# Patient Record
Sex: MALE | Race: WHITE | NOT HISPANIC OR LATINO | Employment: FULL TIME | URBAN - METROPOLITAN AREA
[De-identification: names, ages, dates, MRNs, and addresses within clinical notes are randomized per-mention and may not be internally consistent; named-entity substitution may affect disease eponyms.]

---

## 2017-01-05 ENCOUNTER — GENERIC CONVERSION - ENCOUNTER (OUTPATIENT)
Dept: OTHER | Facility: OTHER | Age: 67
End: 2017-01-05

## 2017-01-12 ENCOUNTER — ALLSCRIPTS OFFICE VISIT (OUTPATIENT)
Dept: OTHER | Facility: OTHER | Age: 67
End: 2017-01-12

## 2017-01-12 DIAGNOSIS — M54.50 LOW BACK PAIN: ICD-10-CM

## 2017-01-16 ENCOUNTER — GENERIC CONVERSION - ENCOUNTER (OUTPATIENT)
Dept: OTHER | Facility: OTHER | Age: 67
End: 2017-01-16

## 2017-02-17 ENCOUNTER — GENERIC CONVERSION - ENCOUNTER (OUTPATIENT)
Dept: OTHER | Facility: OTHER | Age: 67
End: 2017-02-17

## 2017-03-24 ENCOUNTER — GENERIC CONVERSION - ENCOUNTER (OUTPATIENT)
Dept: OTHER | Facility: OTHER | Age: 67
End: 2017-03-24

## 2018-01-14 VITALS
HEIGHT: 73 IN | HEART RATE: 78 BPM | RESPIRATION RATE: 16 BRPM | SYSTOLIC BLOOD PRESSURE: 130 MMHG | BODY MASS INDEX: 32.6 KG/M2 | WEIGHT: 246 LBS | OXYGEN SATURATION: 97 % | DIASTOLIC BLOOD PRESSURE: 80 MMHG

## 2018-05-15 ENCOUNTER — OFFICE VISIT (OUTPATIENT)
Dept: FAMILY MEDICINE CLINIC | Facility: CLINIC | Age: 68
End: 2018-05-15
Payer: COMMERCIAL

## 2018-05-15 VITALS
WEIGHT: 243 LBS | DIASTOLIC BLOOD PRESSURE: 84 MMHG | SYSTOLIC BLOOD PRESSURE: 126 MMHG | HEIGHT: 73 IN | BODY MASS INDEX: 32.2 KG/M2
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
## 2018-05-15 DIAGNOSIS — B35.6 TINEA CRURIS: ICD-10-CM

## 2018-05-15 DIAGNOSIS — Z00.00 WELLNESS EXAMINATION: ICD-10-CM

## 2018-05-15 PROCEDURE — 1036F TOBACCO NON-USER: CPT | Performed by: FAMILY MEDICINE

## 2018-05-15 PROCEDURE — G0438 PPPS, INITIAL VISIT: HCPCS | Performed by: FAMILY MEDICINE

## 2018-05-15 PROCEDURE — 99213 OFFICE O/P EST LOW 20 MIN: CPT | Performed by: FAMILY MEDICINE

## 2018-05-15 RX ORDER — IBUPROFEN 800 MG/1
1 TABLET ORAL 3 TIMES DAILY
COMMUNITY
Start: 2016-10-17 | End: 2020-01-24 | Stop reason: ALTCHOICE

## 2018-05-15 RX ORDER — CLOTRIMAZOLE AND BETAMETHASONE DIPROPIONATE 10; .64 MG/G; MG/G
CREAM TOPICAL 2 TIMES DAILY
Qty: 30 G | Refills: 0
Start: 2018-05-15 | End: 2019-09-03

## 2018-05-15 NOTE — PROGRESS NOTES
HPI:  Ginna Youngblood is a 76 y o  male here for his Initial Wellness Visit  Patient Active Problem List   Diagnosis    Elevated fasting blood sugar    Hyperlipemia, mixed     Past Medical History:   Diagnosis Date    Back pain      History reviewed  No pertinent surgical history  History reviewed  No pertinent family history  History   Smoking Status    Never Smoker   Smokeless Tobacco    Never Used     History   Alcohol Use No      History   Drug Use No     /84   Ht 6' 1" (1 854 m)   Wt 110 kg (243 lb)   BMI 32 06 kg/m²       Current Outpatient Prescriptions   Medication Sig Dispense Refill    clotrimazole-betamethasone (LOTRISONE) 1-0 05 % cream Apply topically 2 (two) times a day 30 g 0    cyclobenzaprine (FLEXERIL) 10 mg tablet Take 1 tablet by mouth 3 (three) times a day as needed for muscle spasms 15 tablet 0    HYDROcodone-acetaminophen (NORCO) 5-325 mg per tablet Take 1 tablet by mouth every 4 (four) hours as needed for pain Max Daily Amount: 6 tablets 18 tablet 0    ibuprofen (MOTRIN) 800 mg tablet Take 1 tablet by mouth 3 (three) times a day       No current facility-administered medications for this visit  No Known Allergies    There is no immunization history on file for this patient      Patient Care Team:  Maryellen De La Rosa MD as PCP - General    Medicare Screening Tests and Risk Assessments:  AWV Clinical     ISAR:       Once in a Lifetime Medicare Screening:       Medicare Screening Tests and Risk Assessment:   AAA Risk Assessment    Osteoporosis Risk Assessment    HIV Risk Assessment        Drug and Alcohol Use:   Tobacco use    Tobacco use duration    Tobacco Cessation Readiness    Alcohol use    Alcohol Treatment Readiness   Illicit Drug Use        Diet & Exercise:   Diet   How many servings a day of the following:   Exercise        Cognitive Impairment Screening:   Cognitive Impairment Screening        Functional Ability/Level of Safety:   Hearing    Hearing Impairment Assessment    Current Activities    Help needed with the folllowing:    ADL    Fall Risk   Injury History       Home Safety:   Home Safety Risk Factors       Advanced Directives:   Advanced Directives    Patient's End of Life Decisions        Urinary Incontinence:       Glaucoma:            Provider Screening    No data filed        No exam data present    Physical Exam :  Physical Exam    Reviewed Updated [facility] Prior Wellness Visits:   Last Medicare wellness visit information was reviewed, patient interviewed , no change since last AWVyes  Last Medicare wellness visit information was reviewed, patient interviewed and updates made to the record today yes    Assessment and Plan:  1  Tick bite, initial encounter     2   Tinea cruris  clotrimazole-betamethasone (LOTRISONE) 1-0 05 % cream       Health Maintenance Due   Topic Date Due    Hepatitis C Screening  1950    Annual Wellnes Visit (AWV)  1950    COLONOSCOPY  1950    Depression Screening PHQ-9  01/08/1962    DTaP,Tdap,and Td Vaccines (1 - Tdap) 01/08/1971    Fall Risk  01/08/2015    PNEUMOCOCCAL POLYSACCHARIDE VACCINE AGE 65 AND OVER  01/08/2015    GLAUCOMA SCREENING 67+ YR  01/08/2017

## 2018-05-15 NOTE — PATIENT INSTRUCTIONS
Patient given paper prescription of Doxycycline to fill if he begins experiencing symptoms  Symptoms of fevers, chills, joint aches, body aches, and other flu-like symptoms are reasons to get doxy prescription filled  Patient given Lotrisone to treat tinea cruris

## 2018-05-15 NOTE — PROGRESS NOTES
[facility]         NAME: Daniel Cat is a 76 y o  male  : 1950    MRN: [id number]  DATE: May 15, 2018  TIME: 9:22 AM    Assessment and Plan   Tick bite, initial encounter [W57  XXXA]  1  Tick bite, initial encounter     2  Tinea cruris  clotrimazole-betamethasone (LOTRISONE) 1-0 05 % cream         Patient Instructions     Patient Instructions   Patient given paper prescription of Doxycycline to fill if he begins experiencing symptoms  Symptoms of fevers, chills, joint aches, body aches, and other flu-like symptoms are reasons to get doxy prescription filled  Patient given Lotrisone to treat tinea cruris  Chief Complaint     Chief Complaint   Patient presents with    Tick Bite     Wants testing on tick and himself         History of Present Illness       Patient found tic on leg on  afternoon  Believes that it may have been attached since Saturday  Also reports rash in groin near tic bite  No fevers, chills, fatigue, joint aches  Review of Systems   Review of Systems   Constitutional: Negative for chills, diaphoresis, fatigue and fever  HENT: Negative for congestion, ear discharge, ear pain, facial swelling, rhinorrhea, sinus pain, sinus pressure, sneezing, sore throat and tinnitus  Eyes: Negative for pain, discharge, redness and itching  Respiratory: Negative for cough, chest tightness, shortness of breath and stridor  Cardiovascular: Negative for chest pain, palpitations and leg swelling  Gastrointestinal: Negative for abdominal pain, constipation, diarrhea, nausea and vomiting  Genitourinary: Negative for difficulty urinating and dysuria  Musculoskeletal: Negative for arthralgias, back pain, gait problem, joint swelling and myalgias  Skin: Positive for rash  Negative for color change, pallor and wound  Neurological: Negative for dizziness, seizures, syncope, light-headedness and headaches           Current Medications       Current Outpatient Prescriptions:     clotrimazole-betamethasone (LOTRISONE) 1-0 05 % cream, Apply topically 2 (two) times a day, Disp: 30 g, Rfl: 0    cyclobenzaprine (FLEXERIL) 10 mg tablet, Take 1 tablet by mouth 3 (three) times a day as needed for muscle spasms, Disp: 15 tablet, Rfl: 0    HYDROcodone-acetaminophen (NORCO) 5-325 mg per tablet, Take 1 tablet by mouth every 4 (four) hours as needed for pain Max Daily Amount: 6 tablets, Disp: 18 tablet, Rfl: 0    ibuprofen (MOTRIN) 800 mg tablet, Take 1 tablet by mouth 3 (three) times a day, Disp: , Rfl:     Current Allergies     Allergies as of 05/15/2018    (No Known Allergies)            The following portions of the patient's history were reviewed and updated as appropriate: allergies, current medications, past family history, past medical history, past social history, past surgical history and problem list      Past Medical History:   Diagnosis Date    Back pain        History reviewed  No pertinent surgical history  History reviewed  No pertinent family history  Medications have been verified  Objective   /84   Ht 6' 1" (1 854 m)   Wt 110 kg (243 lb)   BMI 32 06 kg/m²        Physical Exam     Physical Exam   Constitutional: He is oriented to person, place, and time  He appears well-developed and well-nourished  No distress  HENT:   Head: Normocephalic and atraumatic  Mouth/Throat: Oropharynx is clear and moist  No oropharyngeal exudate  Eyes: Conjunctivae and EOM are normal  Pupils are equal, round, and reactive to light  No scleral icterus  Neck: Normal range of motion  Cardiovascular: Normal rate, regular rhythm and normal heart sounds  Exam reveals no gallop and no friction rub  No murmur heard  Pulmonary/Chest: Effort normal and breath sounds normal  No respiratory distress  Abdominal: Soft  He exhibits no distension  There is no tenderness     Genitourinary:         Genitourinary Comments: Tinea cruris at R & L groin Musculoskeletal: Normal range of motion  He exhibits no edema or deformity  Lymphadenopathy:     He has no cervical adenopathy  Neurological: He is alert and oriented to person, place, and time  Skin: Skin is warm and dry  Rash noted  He is not diaphoretic  No erythema  Psychiatric: He has a normal mood and affect   His behavior is normal  Thought content normal

## 2018-05-22 DIAGNOSIS — B35.6 TINEA CRURIS: ICD-10-CM

## 2018-05-22 RX ORDER — CLOTRIMAZOLE AND BETAMETHASONE DIPROPIONATE 10; .64 MG/G; MG/G
CREAM TOPICAL 2 TIMES DAILY
Qty: 45 G | Refills: 0 | Status: CANCELLED | OUTPATIENT
Start: 2018-05-22

## 2018-05-22 NOTE — TELEPHONE ENCOUNTER
LOTRISONE CREAM NOT COVERED FOR MORE THEN 21 DAYS    USING THIS FOR A FUNGAL INFECTION IN [place]  HE WILL NEED A NEW RX IF YOU WANT HIM TO CONTINUE AND A PRIOR AUTHORIZATION FOR THIS MEDICATION

## 2018-06-22 ENCOUNTER — OFFICE VISIT (OUTPATIENT)
Dept: FAMILY MEDICINE CLINIC | Facility: CLINIC | Age: 68
End: 2018-06-22
Payer: COMMERCIAL

## 2018-06-22 VITALS
OXYGEN SATURATION: 95 % | DIASTOLIC BLOOD PRESSURE: 78 MMHG | HEIGHT: 73 IN | HEART RATE: 76 BPM | BODY MASS INDEX: 32.87 KG/M2 | WEIGHT: 248 LBS | SYSTOLIC BLOOD PRESSURE: 140 MMHG

## 2018-06-22 DIAGNOSIS — R53.83 FATIGUE, UNSPECIFIED TYPE: Primary | ICD-10-CM

## 2018-06-22 PROCEDURE — 3008F BODY MASS INDEX DOCD: CPT | Performed by: FAMILY MEDICINE

## 2018-06-22 PROCEDURE — 1160F RVW MEDS BY RX/DR IN RCRD: CPT | Performed by: FAMILY MEDICINE

## 2018-06-22 PROCEDURE — 99213 OFFICE O/P EST LOW 20 MIN: CPT | Performed by: FAMILY MEDICINE

## 2018-06-22 NOTE — PROGRESS NOTES
[facility]         NAME: Naima Wagner is a 76 y o  male  : 1950    MRN: [id number]  DATE: 2018  TIME: 3:04 PM    Assessment and Plan   Fatigue, unspecified type [R53 83]  1  Fatigue, unspecified type           Patient Instructions     Patient Instructions   Since symptoms have resolved, patient instructed to call if symptoms return and will order TSH, B12, CBC, and testosterone prior to next visit          Chief Complaint     Chief Complaint   Patient presents with    Follow-up     lyme & fatigue         History of Present Illness       Patient presents complaining of fatigue 18 months  He states it has improved and has no other concerns  He was recently treated for Lyme with doxycycline and completed the full course of abx  He currently denies any fevers, chills, chest pain, SOB, abdominal pain, N/V/D or skin changes  Review of Systems   Review of Systems   Constitutional: Positive for fatigue  Negative for appetite change, chills, diaphoresis and fever  HENT: Negative for ear pain, rhinorrhea, sinus pressure and sore throat  Eyes: Negative for discharge, redness and itching  Respiratory: Negative for cough, shortness of breath and wheezing  Cardiovascular: Negative for chest pain and palpitations  Gastrointestinal: Negative for abdominal pain, diarrhea, nausea and vomiting  Genitourinary: Negative for dysuria, frequency and hematuria  Skin: Negative for rash and wound  Neurological: Negative for syncope, light-headedness and numbness           Current Medications       Current Outpatient Prescriptions:     clotrimazole-betamethasone (LOTRISONE) 1-0 05 % cream, Apply topically 2 (two) times a day, Disp: 30 g, Rfl: 0    cyclobenzaprine (FLEXERIL) 10 mg tablet, Take 1 tablet by mouth 3 (three) times a day as needed for muscle spasms, Disp: 15 tablet, Rfl: 0    HYDROcodone-acetaminophen (NORCO) 5-325 mg per tablet, Take 1 tablet by mouth every 4 (four) hours as needed for pain Max Daily Amount: 6 tablets, Disp: 18 tablet, Rfl: 0    ibuprofen (MOTRIN) 800 mg tablet, Take 1 tablet by mouth 3 (three) times a day, Disp: , Rfl:     Current Allergies     Allergies as of 06/22/2018    (No Known Allergies)            The following portions of the patient's history were reviewed and updated as appropriate: allergies, current medications, past family history, past medical history, past social history, past surgical history and problem list      Past Medical History:   Diagnosis Date    Back pain        No past surgical history on file  No family history on file  Medications have been verified  Objective   /78   Pulse 76   Ht 6' 1" (1 854 m)   Wt 112 kg (248 lb)   SpO2 95%   BMI 32 72 kg/m²        Physical Exam     Physical Exam   Constitutional: He appears well-developed and well-nourished  No distress  HENT:   Right Ear: Tympanic membrane, external ear and ear canal normal  Tympanic membrane is not injected  Left Ear: Tympanic membrane, external ear and ear canal normal  Tympanic membrane is not injected  Nose: Nose normal    Mouth/Throat: Uvula is midline, oropharynx is clear and moist and mucous membranes are normal    Eyes: Conjunctivae are normal  Pupils are equal, round, and reactive to light  Neck: Normal range of motion  Neck supple  No thyromegaly present  Cardiovascular: Normal rate, regular rhythm and normal heart sounds  No murmur heard  Pulmonary/Chest: Effort normal and breath sounds normal  No respiratory distress  He has no wheezes  Lymphadenopathy:     He has no cervical adenopathy  Skin: He is not diaphoretic

## 2018-06-22 NOTE — PATIENT INSTRUCTIONS
Since symptoms have resolved, patient instructed to call if symptoms return and will order TSH, B12, CBC, and testosterone prior to next visit

## 2019-05-16 ENCOUNTER — TELEPHONE (OUTPATIENT)
Dept: FAMILY MEDICINE CLINIC | Facility: CLINIC | Age: 69
End: 2019-05-16

## 2019-07-08 ENCOUNTER — TELEPHONE (OUTPATIENT)
Dept: FAMILY MEDICINE CLINIC | Facility: CLINIC | Age: 69
End: 2019-07-08

## 2019-08-23 ENCOUNTER — OFFICE VISIT (OUTPATIENT)
Dept: FAMILY MEDICINE CLINIC | Facility: CLINIC | Age: 69
End: 2019-08-23
Payer: COMMERCIAL

## 2019-08-23 ENCOUNTER — TELEPHONE (OUTPATIENT)
Dept: FAMILY MEDICINE CLINIC | Facility: CLINIC | Age: 69
End: 2019-08-23

## 2019-08-23 ENCOUNTER — HOSPITAL ENCOUNTER (OUTPATIENT)
Dept: RADIOLOGY | Facility: HOSPITAL | Age: 69
Discharge: HOME | End: 2019-08-23
Payer: COMMERCIAL

## 2019-08-23 VITALS
OXYGEN SATURATION: 98 % | SYSTOLIC BLOOD PRESSURE: 122 MMHG | DIASTOLIC BLOOD PRESSURE: 80 MMHG | WEIGHT: 249.56 LBS | TEMPERATURE: 98.2 F | HEIGHT: 73 IN | HEART RATE: 89 BPM | BODY MASS INDEX: 33.08 KG/M2

## 2019-08-23 DIAGNOSIS — R07.81 RIB PAIN ON LEFT SIDE: ICD-10-CM

## 2019-08-23 DIAGNOSIS — R07.81 RIB PAIN ON LEFT SIDE: Primary | ICD-10-CM

## 2019-08-23 PROCEDURE — 3008F BODY MASS INDEX DOCD: CPT | Performed by: NURSE PRACTITIONER

## 2019-08-23 PROCEDURE — 99213 OFFICE O/P EST LOW 20 MIN: CPT | Performed by: NURSE PRACTITIONER

## 2019-08-23 PROCEDURE — 71101 X-RAY EXAM UNILAT RIBS/CHEST: CPT

## 2019-08-23 PROCEDURE — 1160F RVW MEDS BY RX/DR IN RCRD: CPT | Performed by: NURSE PRACTITIONER

## 2019-08-23 PROCEDURE — 1036F TOBACCO NON-USER: CPT | Performed by: NURSE PRACTITIONER

## 2019-08-23 NOTE — PATIENT INSTRUCTIONS
Xray of rib as ordered  Tylenol/Ibuprofen/Ice for pain and inflammation as directed  Call/return with any problems or concerns  GO to ER with any chest pain or shortness of breath  Schedule appointment for health maintenance exam

## 2019-08-23 NOTE — PROGRESS NOTES
[facility]        NAME: Delaney Day is a 71 y o  male  : 1950    MRN: [id number]  DATE: 2019  TIME: 10:43 AM    Assessment and Plan   Rib pain on left side [R07 81]  1  Rib pain on left side  XR ribs 2 vw left         Patient Instructions     Patient Instructions   Xray of rib as ordered  Tylenol/Ibuprofen/Ice for pain and inflammation as directed  Call/return with any problems or concerns  GO to ER with any chest pain or shortness of breath  Schedule appointment for health maintenance exam          Chief Complaint     Chief Complaint   Patient presents with    Pain     left side rib sore for the past week         History of Present Illness       Left rib pain x 5 days  Lying on stomach to fix something at work which caused the pain  Has not taken anything to relieve the pain  Wearing a compression strap which gives some relief  Has a HX of rib fracture on left  Review of Systems   Review of Systems   Constitutional: Negative for activity change and fever  Respiratory: Negative for shortness of breath  Cardiovascular: Negative for chest pain and palpitations  Gastrointestinal: Negative for abdominal distention and abdominal pain  Musculoskeletal: Positive for myalgias (left sided rib pain)  Arthralgias: left rib pain  Neurological: Negative for dizziness, weakness and numbness           Current Medications       Current Outpatient Medications:     clotrimazole-betamethasone (LOTRISONE) 1-0 05 % cream, Apply topically 2 (two) times a day, Disp: 30 g, Rfl: 0    cyclobenzaprine (FLEXERIL) 10 mg tablet, Take 1 tablet by mouth 3 (three) times a day as needed for muscle spasms, Disp: 15 tablet, Rfl: 0    HYDROcodone-acetaminophen (NORCO) 5-325 mg per tablet, Take 1 tablet by mouth every 4 (four) hours as needed for pain Max Daily Amount: 6 tablets, Disp: 18 tablet, Rfl: 0    ibuprofen (MOTRIN) 800 mg tablet, Take 1 tablet by mouth 3 (three) times a day, Disp: , Rfl:     Current Allergies     Allergies as of 08/23/2019    (No Known Allergies)            The following portions of the patient's history were reviewed and updated as appropriate: allergies, current medications, past family history, past medical history, past social history, past surgical history and problem list      Past Medical History:   Diagnosis Date    Back pain        History reviewed  No pertinent surgical history  History reviewed  No pertinent family history  Medications have been verified  Objective   /80 (BP Location: Left arm, Patient Position: Sitting, Cuff Size: Extra-Large)   Pulse 89   Temp 98 2 °F (36 8 °C) (Tympanic)   Ht 6' 1" (1 854 m)   Wt 113 kg (249 lb 9 oz)   SpO2 98%   BMI 32 93 kg/m²        Physical Exam     Physical Exam   Constitutional: He is oriented to person, place, and time  He appears well-developed and well-nourished  No distress  Cardiovascular: Normal rate, regular rhythm and normal heart sounds  No murmur heard  Pulmonary/Chest: Effort normal and breath sounds normal  No respiratory distress  He has no wheezes  Musculoskeletal: Normal range of motion  He exhibits tenderness (tenderness with palpation to 5th rib under  left breast)  He exhibits no edema or deformity  Neurological: He is alert and oriented to person, place, and time  Skin: Skin is warm and dry  He is not diaphoretic  Psychiatric: He has a normal mood and affect  His behavior is normal  Judgment and thought content normal    Nursing note and vitals reviewed

## 2019-08-23 NOTE — TELEPHONE ENCOUNTER
----- Message from [address] [place] [address],[address] sent at 8/23/2019  4:32 PM EDT -----  Call with results   Rib and chest xray normal

## 2019-08-23 NOTE — TELEPHONE ENCOUNTER
Patient informed  But wants to know what may be causing the issue  Also questioning if  Xray if there any tumor or other   Please advise

## 2019-08-26 NOTE — TELEPHONE ENCOUNTER
Discomfort most likely due to muscle strain  Can take a few weeks to heal  It pain continues or gets worse he can follow up with orthopedic for the rib pain

## 2019-09-03 ENCOUNTER — OFFICE VISIT (OUTPATIENT)
Dept: FAMILY MEDICINE CLINIC | Facility: CLINIC | Age: 69
End: 2019-09-03
Payer: COMMERCIAL

## 2019-09-03 VITALS
BODY MASS INDEX: 33.13 KG/M2 | OXYGEN SATURATION: 98 % | WEIGHT: 250 LBS | SYSTOLIC BLOOD PRESSURE: 126 MMHG | HEIGHT: 73 IN | TEMPERATURE: 97.8 F | DIASTOLIC BLOOD PRESSURE: 80 MMHG | HEART RATE: 60 BPM

## 2019-09-03 DIAGNOSIS — E78.2 HYPERLIPEMIA, MIXED: ICD-10-CM

## 2019-09-03 DIAGNOSIS — R06.00 DYSPNEA ON EXERTION: ICD-10-CM

## 2019-09-03 DIAGNOSIS — Z00.00 MEDICARE ANNUAL WELLNESS VISIT, SUBSEQUENT: Primary | ICD-10-CM

## 2019-09-03 DIAGNOSIS — S20.212A CONTUSION OF RIB ON LEFT SIDE, INITIAL ENCOUNTER: ICD-10-CM

## 2019-09-03 DIAGNOSIS — R73.01 ELEVATED FASTING BLOOD SUGAR: ICD-10-CM

## 2019-09-03 DIAGNOSIS — Z13.6 SCREENING FOR AAA (ABDOMINAL AORTIC ANEURYSM): ICD-10-CM

## 2019-09-03 DIAGNOSIS — Z12.5 ENCOUNTER FOR PROSTATE CANCER SCREENING: ICD-10-CM

## 2019-09-03 PROCEDURE — G0439 PPPS, SUBSEQ VISIT: HCPCS | Performed by: FAMILY MEDICINE

## 2019-09-03 PROCEDURE — 3008F BODY MASS INDEX DOCD: CPT | Performed by: FAMILY MEDICINE

## 2019-09-03 PROCEDURE — 99214 OFFICE O/P EST MOD 30 MIN: CPT | Performed by: FAMILY MEDICINE

## 2019-09-03 NOTE — PATIENT INSTRUCTIONS
Medical management-repeat labs as ordered  Due to shortness of breath with exertion would refer to Cardiology for evaluation for possible stress test     Health maintenance-you have declined all vaccines  Metabolic screens will be ordered  Advanced directives were discussed forms provided  Depression screen negative  Hepatitis-C had been screened in the past was negative  PSA screening will be ordered  [place] guard was negative  Obesity   AMBULATORY CARE:   Obesity  is when your body mass index (BMI) is greater than 30  Your healthcare provider will use your height and weight to measure your BMI  The risks of obesity include  many health problems, such as injuries or physical disability  You may need tests to check for the following:  · Diabetes     · High blood pressure or high cholesterol     · Heart disease     · Gallbladder or liver disease     · Cancer of the colon, breast, prostate, liver, or kidney     · Sleep apnea     · Arthritis or gout  Seek care immediately if:   · You have a severe headache, confusion, or difficulty speaking  · You have weakness on one side of your body  · You have chest pain, sweating, or shortness of breath  Contact your healthcare provider if:   · You have symptoms of gallbladder or liver disease, such as pain in your upper abdomen  · You have knee or hip pain and discomfort while walking  · You have symptoms of diabetes, such as intense hunger and thirst, and frequent urination  · You have symptoms of sleep apnea, such as snoring or daytime sleepiness  · You have questions or concerns about your condition or care  Treatment for obesity  focuses on helping you lose weight to improve your health  Even a small decrease in BMI can reduce the risk for many health problems  Your healthcare provider will help you set a weight-loss goal   · Lifestyle changes  are the first step in treating obesity   These include making healthy food choices and getting regular physical activity  Your healthcare provider may suggest a weight-loss program that involves coaching, education, and therapy  · Medicine  may help you lose weight when it is used with a healthy diet and physical activity  · Surgery  can help you lose weight if you are very obese and have other health problems  There are several types of weight-loss surgery  Ask your healthcare provider for more information  Be successful losing weight:   · Set small, realistic goals  An example of a small goal is to walk for 20 minutes 5 days a week  Anther goal is to lose 5% of your body weight  · Tell friends, family members, and coworkers about your goals  and ask for their support  Ask a friend to lose weight with you, or join a weight-loss support group  · Identify foods or triggers that may cause you to overeat , and find ways to avoid them  Remove tempting high-calorie foods from your home and workplace  Place a bowl of fresh fruit on your kitchen counter  If stress causes you to eat, then find other ways to cope with stress  · Keep a diary to track what you eat and drink  Also write down how many minutes of physical activity you do each day  Weigh yourself once a week and record it in your diary  Eating changes: You will need to eat 500 to 1,000 fewer calories each day than you currently eat to lose 1 to 2 pounds a week  The following changes will help you cut calories:  · Eat smaller portions  Use small plates, no larger than 9 inches in diameter  Fill your plate half full of fruits and vegetables  Measure your food using measuring cups until you know what a serving size looks like  · Eat 3 meals and 1 or 2 snacks each day  Plan your meals in advance  Ana Maria Bilberry and eat at home most of the time  Eat slowly  · Eat fruits and vegetables at every meal   They are low in calories and high in fiber, which makes you feel full  Do not add butter, margarine, or cream sauce to vegetables   Use herbs to season steamed vegetables  · Eat less fat and fewer fried foods  Eat more baked or grilled chicken and fish  These protein sources are lower in calories and fat than red meat  Limit fast food  Dress your salads with olive oil and vinegar instead of bottled dressing  · Limit the amount of sugar you eat  Do not drink sugary beverages  Limit alcohol  Activity changes:  Physical activity is good for your body in many ways  It helps you burn calories and build strong muscles  It decreases stress and depression, and improves your mood  It can also help you sleep better  Talk to your healthcare provider before you begin an exercise program   · Exercise for at least 30 minutes 5 days a week  Start slowly  Set aside time each day for physical activity that you enjoy and that is convenient for you  It is best to do both weight training and an activity that increases your heart rate, such as walking, bicycling, or swimming  · Find ways to be more active  Do yard work and housecleaning  Walk up the stairs instead of using elevators  Spend your leisure time going to events that require walking, such as outdoor festivals or fairs  This extra physical activity can help you lose weight and keep it off  Follow up with your healthcare provider as directed: You may need to meet with a dietitian  Write down your questions so you remember to ask them during your visits  © 2017 [address] [facility] Information is for End User's use only and may not be sold, redistributed or otherwise used for commercial purposes  All illustrations and images included in CareNotes® are the copyrighted property of [employer]  or Arsenio Chaves  The above information is an  only  It is not intended as medical advice for individual conditions or treatments  Talk to your doctor, nurse or pharmacist before following any medical regimen to see if it is safe and effective for you    Urinary Incontinence WHAT YOU NEED TO KNOW:   What is urinary incontinence? Urinary incontinence (UI) is when you lose control of your bladder  What causes UI? UI occurs because your bladder cannot store or empty urine properly  The following are the most common types of UI:  · Stress incontinence  is when you leak urine due to increased bladder pressure  This may happen when you cough, sneeze, or exercise  · Urge incontinence  is when you feel the need to urinate right away and leak urine accidentally  · Mixed incontinence  is when you have both stress and urge UI  What are the signs and symptoms of UI?   · You feel like your bladder does not empty completely when you urinate  · You urinate often and need to urinate immediately  · You leak urine when you sleep, or you wake up with the urge to urinate  · You leak urine when you cough, sneeze, exercise, or laugh  How is UI diagnosed? Your healthcare provider will ask how often you leak urine and whether you have stress or urge symptoms  Tell him which medicines you take, how often you urinate, and how much liquid you drink each day  You may need any of the following tests:  · Urine tests  may show infection or kidney function  · A pelvic exam  may be done to check for blockages  A pelvic exam will also show if your bladder, uterus, or other organs have moved out of place  · An x-ray, ultrasound, or CT  may show problems with parts of your urinary system  You may be given contrast liquid to help your organs show up better in the pictures  Tell the healthcare provider if you have ever had an allergic reaction to contrast liquid  Do not enter the MRI room with anything metal  Metal can cause serious injury  Tell the healthcare provider if you have any metal in or on your body  · A bladder scan  will show how much urine is left in your bladder after you urinate   You will be asked to urinate and then healthcare providers will use a small ultrasound machine to check the urine left in your bladder  · Cystometry  is used to check the function of your urinary system  Your healthcare provider checks the pressure in your bladder while filling it with fluid  Your bladder pressure may also be tested when your bladder is full and while you urinate  How is UI treated? · Medicines  can help strengthen your bladder control  · Electrical stimulation  is used to send a small amount of electrical energy to your pelvic floor muscles  This helps control your bladder function  Electrodes may be placed outside your body or in your rectum  For women, the electrodes may be placed in the vagina  · A bulking agent  may be injected into the wall of your urethra to make it thicker  This helps keep your urethra closed and decreases urine leakage  · Devices  such as a clamp, pessary, or tampon may help stop urine leaks  Ask your healthcare provider for more information about these and other devices  · Surgery  may be needed if other treatments do not work  Several types of surgery can help improve your bladder control  Ask your healthcare provider for more information about the surgery you may need  How can I manage my symptoms? · Do pelvic muscle exercises often  Your pelvic muscles help you stop urinating  Squeeze these muscles tight for 5 seconds, then relax for 5 seconds  Gradually work up to squeezing for 10 seconds  Do 3 sets of 15 repetitions a day, or as directed  This will help strengthen your pelvic muscles and improve bladder control  · A catheter  may be used to help empty your bladder  A catheter is a tiny, plastic tube that is put into your bladder to drain your urine  Your healthcare provider may tell you to use a catheter to prevent your bladder from getting too full and leaking urine  · Keep a UI record  Write down how often you leak urine and how much you leak  Make a note of what you were doing when you leaked urine  · Train your bladder  Go to the bathroom at set times, such as every 2 hours, even if you do not feel the urge to go  You can also try to hold your urine when you feel the urge to go  For example, hold your urine for 5 minutes when you feel the urge to go  As that becomes easier, hold your urine for 10 minutes  · Drink liquids as directed  Ask your healthcare provider how much liquid to drink each day and which liquids are best for you  You may need to limit the amount of liquid you drink to help control your urine leakage  Limit or do not have drinks that contain caffeine or alcohol  Do not drink any liquid right before you go to bed  · Prevent constipation  Eat a variety of high-fiber foods  Good examples are high-fiber cereals, beans, vegetables, and whole-grain breads  Prune juice may help make your bowel movement softer  Walking is the best way to trigger your intestines to have a bowel movement  · Exercise regularly and maintain a healthy weight  Ask your healthcare provider how much you should weigh and about the best exercise plan for you  Weight loss and exercise will decrease pressure on your bladder and help you control your leakage  Ask him to help you create a weight loss plan if you are overweight  When should I seek immediate care? · You have severe pain  · You are confused or cannot think clearly  When should I contact my healthcare provider? · You have a fever  · You see blood in your urine  · You have pain when you urinate  · You have new or worse pain, even after treatment  · Your mouth feels dry or you have vision changes  · Your urine is cloudy or smells bad  · You have questions or concerns about your condition or care  CARE AGREEMENT:   You have the right to help plan your care  Learn about your health condition and how it may be treated  Discuss treatment options with your caregivers to decide what care you want to receive  You always have the right to refuse treatment   The above information is an  only  It is not intended as medical advice for individual conditions or treatments  Talk to your doctor, nurse or pharmacist before following any medical regimen to see if it is safe and effective for you  © 2017 [address] [employer] Information is for End User's use only and may not be sold, redistributed or otherwise used for commercial purposes  All illustrations and images included in CareNotes® are the copyrighted property of [employer]  or [employer]  Cigarette Smoking and Your Health   AMBULATORY CARE:   Risks to your health if you smoke:  Nicotine and other chemicals found in tobacco damage every cell in your body  Even if you are a light smoker, you have an increased risk for cancer, heart disease, and lung disease  If you are pregnant or have diabetes, smoking increases your risk for complications  Benefits to your health if you stop smoking:   · You decrease respiratory symptoms such as coughing, wheezing, and shortness of breath  · You reduce your risk for cancers of the lung, mouth, throat, kidney, bladder, pancreas, stomach, and cervix  If you already have cancer, you increase the benefits of chemotherapy  You also reduce your risk for cancer returning or a second cancer from developing  · You reduce your risk for heart disease, blood clots, heart attack, and stroke  · You reduce your risk for lung infections, and diseases such as pneumonia, asthma, chronic bronchitis, and emphysema  · Your circulation improves  More oxygen can be delivered to your body  If you have diabetes, you lower your risk for complications, such as kidney, artery, and eye diseases  You also lower your risk for nerve damage  Nerve damage can lead to amputations, poor vision, and blindness  · You improve your body's ability to heal and to fight infections    Benefits to the health of others if you stop smoking:  Tobacco is harmful to nonsmokers who breathe in your secondhand smoke  The following are ways the health of others around you may improve when you stop smoking:  · You lower the risks for lung cancer and heart disease in nonsmoking adults  · If you are pregnant, you lower the risk for miscarriage, early delivery, low birth weight, and stillbirth  You also lower your baby's risk for SIDS, obesity, developmental delay, and neurobehavioral problems, such as ADHD  · If you have children, you lower their risk for ear infections, colds, pneumonia, bronchitis, and asthma  For more information and support to stop smoking:   · [employer]  Phone: 7- 187 - 388-5012  Web Address: www [employer]  Follow up with your healthcare provider as directed:  Write down your questions so you remember to ask them during your visits  © 2017 [address] [employer] Information is for End User's use only and may not be sold, redistributed or otherwise used for commercial purposes  All illustrations and images included in CareNotes® are the copyrighted property of [employer] M , Inc  or Arsenio Chaves  The above information is an  only  It is not intended as medical advice for individual conditions or treatments  Talk to your doctor, nurse or pharmacist before following any medical regimen to see if it is safe and effective for you  Fall Prevention   WHAT YOU NEED TO KNOW:   What is fall prevention? Fall prevention includes ways to make your home and other areas safer  It also includes ways you can move more carefully to prevent a fall  What increases my risk for falls?    · Lack of vitamin D    · Not getting enough sleep each night    · Trouble walking or keeping your balance, or foot problems    · Health conditions that cause changes in your blood pressure, vision, or muscle strength and coordination    · Medicines that make you dizzy, weak, or sleepy    · Problems seeing clearly    · Shoes that have high heels or are not supportive    · Tripping hazards, such as items left on the floor, no handrails on the stairs, or broken steps  How can I help protect myself from falls? · Stand or sit up slowly  This may help you keep your balance and prevent falls  If you need to get up during the night, sit up first  Be sure you are fully awake before you stand  Turn on the light before you start walking  Go slowly in case you are still sleepy  Make sure you will not trip over any pets sleeping in the bedroom  · Use assistive devices as directed  Your healthcare provider may suggest that you use a cane or walker to help you keep your balance  You may need to have grab bars put in your bathroom near the toilet or in the shower  · Wear shoes that fit well and have soles that   Wear shoes both inside and outside  Use slippers with good   Do not wear shoes with high heels  · Wear a personal alarm  This is a device that allows you to call 911 if you fall and need help  Ask your healthcare provider for more information  · Stay active  Exercise can help strengthen your muscles and improve your balance  Your healthcare provider may recommend water aerobics or walking  He or she may also recommend physical therapy to improve your coordination  Never start an exercise program without talking to your healthcare provider first      · Manage medical conditions  Keep all appointments with your healthcare providers  Visit your eye doctor as directed  How can I make my home safer? · Add items to prevent falls in the bathroom  Put nonslip strips on your bath or shower floor to prevent you from slipping  Use a bath mat if you do not have carpet in the bathroom  This will prevent you from falling when you step out of the bath or shower  Use a shower seat so you do not need to stand while you shower  Sit on the toilet or a chair in your bathroom to dry yourself and put on clothing   This will prevent you from losing your balance from drying or dressing yourself while you are standing  · Keep paths clear  Remove books, shoes, and other objects from walkways and stairs  Place cords for telephones and lamps out of the way so that you do not need to walk over them  Tape them down if you cannot move them  Remove small rugs  If you cannot remove a rug, secure it with double-sided tape  This will prevent you from tripping  · Install bright lights in your home  Use night lights to help light paths to the bathroom or kitchen  Always turn on the light before you start walking  · Keep items you use often on shelves within reach  Do not use a step stool to help you reach an item  · Paint or place reflective tape on the edges of your stairs  This will help you see the stairs better  Call 911 or have someone else call if:   · You have fallen and are unconscious  · You have fallen and cannot move part of your body  Contact your healthcare provider if:   · You have fallen and have pain or a headache  · You have questions or concerns about your condition or care  CARE AGREEMENT:   You have the right to help plan your care  Learn about your health condition and how it may be treated  Discuss treatment options with your caregivers to decide what care you want to receive  You always have the right to refuse treatment  The above information is an  only  It is not intended as medical advice for individual conditions or treatments  Talk to your doctor, nurse or pharmacist before following any medical regimen to see if it is safe and effective for you  © 2017 [address] [employer] Information is for End User's use only and may not be sold, redistributed or otherwise used for commercial purposes  All illustrations and images included in CareNotes® are the copyrighted property of [employer] D [employer] M , Inc  or Arsenio Chaves  Advance Directives   WHAT YOU NEED TO KNOW:   What are advance directives?   Advance directives are legal documents that state your wishes and plans for medical care  These plans are made ahead of time in case you lose your ability to make decisions for yourself  Advance directives can apply to any medical decision, such as the treatments you want, and if you want to donate organs  What are the types of advance directives? There are many types of advance directives, and each state has rules about how to use them  You may choose a combination of any of the following:  · Living will: This is a written record of the treatment you want  You can also choose which treatments you do not want, which to limit, and which to stop at a certain time  This includes surgery, medicine, IV fluid, and tube feedings  · Durable power of  for healthcare [facility] [place]): This is a written record that states who you want to make healthcare choices for you when you are unable to make them for yourself  This person, called a proxy, is usually a family member or a friend  You may choose more than 1 proxy  · Do not resuscitate (DNR) order:  A DNR order is used in case your heart stops beating or you stop breathing  It is a request not to have certain forms of treatment, such as CPR  A DNR order may be included in other types of advance directives  · Medical directive: This covers the care that you want if you are in a coma, near death, or unable to make decisions for yourself  You can list the treatments you want for each condition  Treatment may include pain medicine, surgery, blood transfusions, dialysis, IV or tube feedings, and a ventilator (breathing machine)  · Values history: This document has questions about your views, beliefs, and how you feel and think about life  This information can help others choose the care that you would choose  Why are advance directives important? An advance directive helps you control your care   Although spoken wishes may be used, it is better to have your wishes written down  Spoken wishes can be misunderstood, or not followed  Treatments may be given even if you do not want them  An advance directive may make it easier for your family to make difficult choices about your care  How do I decide what to put in my advance directives? · Make informed decisions:  Make sure you fully understand treatments or care you may receive  Think about the benefits and problems your decisions could cause for you or your family  Talk to healthcare providers if you have concerns or questions before you write down your wishes  You may also want to talk with your Buddhism or , or a   Check your state laws to make sure that what you put in your advance directive is legal      · Sign all forms:  Sign and date your advance directive when you have finished  You may also need 2 witnesses to sign the forms  Witnesses cannot be your doctor or his staff, your spouse, heirs or beneficiaries, people you owe money to, or your chosen proxy  Talk to your family, proxy, and healthcare providers about your advance directive  Give each person a copy, and keep one for yourself in a place you can get to easily  Do not keep it hidden or locked away  · Review and revise your plans: You can revise your advance directive at any time, as long as you are able to make decisions  Review your plan every year, and when there are changes in your life, or your health  When you make changes, let your family, proxy, and healthcare providers know  Give each a new copy  Where can I find more information? · [employer]  [address] , [address]  Phone: 3- 575 - [phone]  Phone: 1- 912 - [phone]  Web Address: http://www  aafp org  · [address] [facility])  [address] [facility], [address] , [address] [place] [address]  Phone: [phone]  Phone: 2- [phone]  Web Address: Brenda HEARN AGREEMENT:   You have the right to help plan your care  To help with this plan, you must learn about your health condition and treatment options  You must also learn about advance directives and how they are used  Work with your healthcare providers to decide what care will be used to treat you  You always have the right to refuse treatment  The above information is an  only  It is not intended as medical advice for individual conditions or treatments  Talk to your doctor, nurse or pharmacist before following any medical regimen to see if it is safe and effective for you  © 2017 [address] [employer]  Information is for End User's use only and may not be sold, redistributed or otherwise used for commercial purposes  All illustrations and images included in CareNotes® are the copyrighted property of [employer]  or Arsenio Chaves  Weight Management   AMBULATORY CARE:   Why it is important to manage your weight:  Being overweight increases your risk of health conditions such as heart disease, high blood pressure, type 2 diabetes, and certain types of cancer  It can also increase your risk for osteoarthritis, sleep apnea, and other respiratory problems  Aim for a slow, steady weight loss  Even a small amount of weight loss can lower your risk of health problems  How to lose weight safely:  A safe and healthy way to lose weight is to eat fewer calories and get regular exercise  You can lose up about 1 pound a week by decreasing the number of calories you eat by 500 calories each day  You can decrease calories by eating smaller portion sizes or by cutting out high-calorie foods  Read labels to find out how many calories are in the foods you eat  You can also burn calories with exercise such as walking, swimming, or biking  You will be more likely to keep weight off if you make these changes part of your lifestyle     Healthy meal plan for weight management:  A healthy meal plan includes a variety of foods, contains fewer calories, and helps you stay healthy  A healthy meal plan includes the following:  · Eat whole-grain foods more often  A healthy meal plan should contain fiber  Fiber is the part of grains, fruits, and vegetables that is not broken down by your body  Whole-grain foods are healthy and provide extra fiber in your diet  Some examples of whole-grain foods are whole-wheat breads and pastas, oatmeal, brown rice, and bulgur  · Eat a variety of vegetables every day  Include dark, leafy greens such as spinach, kale, pawan greens, and mustard greens  Eat yellow and orange vegetables such as carrots, sweet potatoes, and winter squash  · Eat a variety of fruits every day  Choose fresh or canned fruit (canned in its own juice or light syrup) instead of juice  Fruit juice has very little or no fiber  · Eat low-fat dairy foods  Drink fat-free (skim) milk or 1% milk  Eat fat-free yogurt and low-fat cottage cheese  Try low-fat cheeses such as mozzarella and other reduced-fat cheeses  · Choose meat and other protein foods that are low in fat  Choose beans or other legumes such as split peas or lentils  Choose fish, skinless poultry (chicken or turkey), or lean cuts of red meat (beef or pork)  Before you cook meat or poultry, cut off any visible fat  · Use less fat and oil  Try baking foods instead of frying them  Add less fat, such as margarine, sour cream, regular salad dressing and mayonnaise to foods  Eat fewer high-fat foods  Some examples of high-fat foods include french fries, doughnuts, ice cream, and cakes  · Eat fewer sweets  Limit foods and drinks that are high in sugar  This includes candy, cookies, regular soda, and sweetened drinks  Ways to decrease calories:   · Eat smaller portions  ¨ Use a small plate with smaller servings  ¨ Do not eat second helpings      ¨ When you eat at a restaurant, ask for a box and place half of your meal in the box before you eat  ¨ Share an entrée with someone else  · Replace high-calorie snacks with healthy, low-calorie snacks  ¨ Choose fresh fruit, vegetables, fat-free rice cakes, or air-popped popcorn instead of potato chips, nuts, or chocolate  ¨ Choose water or calorie-free drinks instead of soda or sweetened drinks  · Eat regular meals  Skipping meals can lead to overeating later in the day  Eat a healthy snack in place of a meal if you do not have time to eat a regular meal      · Do not shop for groceries when you are hungry  You may be more likely to make unhealthy food choices  Take a grocery list of healthy foods and shop after you have eaten  Exercise:  Exercise at least 30 minutes per day on most days of the week  Some examples of exercise include walking, biking, dancing, and swimming  You can also fit in more physical activity by taking the stairs instead of the elevator or parking farther away from stores  Ask your healthcare provider about the best exercise plan for you  Other things to consider as you try to lose weight:   · Be aware of situations that may give you the urge to overeat, such as eating while watching television  Find ways to avoid these situations  For example, read a book, go for a walk, or do crafts  · Meet with a weight loss support group or friends who are also trying to lose weight  This may help you stay motivated to continue working on your weight loss goals  © 2017 [employer] Information is for End User's use only and may not be sold, redistributed or otherwise used for commercial purposes  All illustrations and images included in CareNotes® are the copyrighted property of [employer]  or Arsenio Chaves  The above information is an  only  It is not intended as medical advice for individual conditions or treatments   Talk to your doctor, nurse or pharmacist before following any medical regimen to see if it is safe and effective for you  Heart Healthy Diet   AMBULATORY CARE:   A heart healthy diet  is an eating plan low in total fat, unhealthy fats, and sodium (salt)  A heart healthy diet helps decrease your risk for heart disease and stroke  Limit the amount of fat you eat to 25% to 35% of your total daily calories  Limit sodium to less than 2,300 mg each day  Healthy fats:  Healthy fats can help improve cholesterol levels  The risk for heart disease is decreased when cholesterol levels are normal  Choose healthy fats, such as the following:  · Unsaturated fat  is found in foods such as soybean, canola, olive, corn, and safflower oils  It is also found in soft tub margarine that is made with liquid vegetable oil  · Omega-3 fat  is found in certain fish, such as salmon, tuna, and trout, and in walnuts and flaxseed  Unhealthy fats:  Unhealthy fats can cause unhealthy cholesterol levels in your blood and increase your risk of heart disease  Limit unhealthy fats, such as the following:  · Cholesterol  is found in animal foods, such as eggs and lobster, and in dairy products made from whole milk  Limit cholesterol to less than 300 milligrams (mg) each day  You may need to limit cholesterol to 200 mg each day if you have heart disease  · Saturated fat  is found in meats, such as geiger and hamburger  It is also found in chicken or turkey skin, whole milk, and butter  Limit saturated fat to less than 7% of your total daily calories  Limit saturated fat to less than 6% if you have heart disease or are at increased risk for it  · Trans fat  is found in packaged foods, such as potato chips and cookies  It is also in hard margarine, some fried foods, and shortening  Avoid trans fats as much as possible    Heart healthy foods and drinks to include:  Ask your dietitian or healthcare provider how many servings to have from each of the following food groups:  · Grains:      ¨ Whole-wheat breads, cereals, and pastas, and [employer] rice    ¨ Low-fat, low-sodium crackers and chips    · Vegetables:      ¨ Broccoli, green beans, green peas, and spinach    ¨ Collards, kale, and lima beans    ¨ Carrots, sweet potatoes, tomatoes, and peppers    ¨ Canned vegetables with no salt added    · Fruits:      ¨ Bananas, peaches, pears, and pineapple    ¨ Grapes, raisins, and dates    ¨ Oranges, tangerines, grapefruit, orange juice, and grapefruit juice    ¨ Apricots, mangoes, melons, and papaya    ¨ Raspberries and strawberries    ¨ Canned fruit with no added sugar    · Low-fat dairy products:      ¨ Nonfat (skim) milk, 1% milk, and low-fat almond, cashew, or soy milks fortified with calcium    ¨ Low-fat cheese, regular or frozen yogurt, and cottage cheese    · Meats and proteins , such as lean cuts of beef and pork (loin, leg, round), skinless chicken and turkey, legumes, soy products, egg whites, and nuts  Foods and drinks to limit or avoid:  Ask your dietitian or healthcare provider about these and other foods that are high in unhealthy fat, sodium, and sugar:  · Snack or packaged foods , such as frozen dinners, cookies, macaroni and cheese, and cereals with more than 300 mg of sodium per serving    · Canned or dry mixes  for cakes, soups, sauces, or gravies    · Vegetables with added sodium , such as instant potatoes, vegetables with added sauces, or regular canned vegetables    · Other foods high in sodium , such as ketchup, barbecue sauce, salad dressing, pickles, olives, soy sauce, and miso    · High-fat dairy foods  such as whole or 2% milk, cream cheese, or sour cream, and cheeses     · High-fat protein foods  such as high-fat cuts of beef (T-bone steaks, ribs), chicken or turkey with skin, and organ meats, such as liver    · Cured or smoked meats , such as hot dogs, geiger, and sausage    · Unhealthy fats and oils , such as butter, stick margarine, shortening, and cooking oils such as coconut or palm oil    · Food and drinks high in sugar , such as soft drinks (soda), sports drinks, sweetened tea, candy, cake, cookies, pies, and doughnuts  Other diet guidelines to follow:   · Eat more foods containing omega-3 fats  Eat fish high in omega-3 fats at least 2 times a week  · Limit alcohol  Too much alcohol can damage your heart and raise your blood pressure  Women should limit alcohol to 1 drink a day  Men should limit alcohol to 2 drinks a day  A drink of alcohol is 12 ounces of beer, 5 ounces of wine, or 1½ ounces of liquor  · Choose low-sodium foods  High-sodium foods can lead to high blood pressure  Add little or no salt to food you prepare  Use herbs and spices in place of salt  · Eat more fiber  to help lower cholesterol levels  Eat at least 5 servings of fruits and vegetables each day  Eat 3 ounces of whole-grain foods each day  Legumes (beans) are also a good source of fiber  Lifestyle guidelines:   · Do not smoke  Nicotine and other chemicals in cigarettes and cigars can cause lung and heart damage  Ask your healthcare provider for information if you currently smoke and need help to quit  E-cigarettes or smokeless tobacco still contain nicotine  Talk to your healthcare provider before you use these products  · Exercise regularly  to help you maintain a healthy weight and improve your blood pressure and cholesterol levels  Ask your healthcare provider about the best exercise plan for you  Do not start an exercise program without asking your healthcare provider  Follow up with your healthcare provider as directed:  Write down your questions so you remember to ask them during your visits  © 2017 [employer] Information is for End User's use only and may not be sold, redistributed or otherwise used for commercial purposes  All illustrations and images included in CareNotes® are the copyrighted property of [employer]  or Arsenio Chaves  The above information is an  only   It is not intended as medical advice for individual conditions or treatments  Talk to your doctor, nurse or pharmacist before following any medical regimen to see if it is safe and effective for you

## 2019-09-03 NOTE — PROGRESS NOTES
Assessment and Plan:     Problem List Items Addressed This Visit     None      Visit Diagnoses     Medicare annual wellness visit, subsequent    -  Primary    BMI 32 0-32 9,adult             History of Present Illness:     Patient presents for Medicare Annual Wellness visit    Patient Care Team:  Aidan Augustin MD as PCP - General     Problem List:     Patient Active Problem List   Diagnosis    Elevated fasting blood sugar    Hyperlipemia, mixed      Past Medical and Surgical History:     Past Medical History:   Diagnosis Date    Back pain      History reviewed  No pertinent surgical history  Family History:     History reviewed  No pertinent family history  Social History:     Social History     Tobacco Use   Smoking Status Never Smoker   Smokeless Tobacco Never Used     Social History     Substance and Sexual Activity   Alcohol Use No     Social History     Substance and Sexual Activity   Drug Use No      Medications and Allergies:     Current Outpatient Medications   Medication Sig Dispense Refill    clotrimazole-betamethasone (LOTRISONE) 1-0 05 % cream Apply topically 2 (two) times a day 30 g 0    cyclobenzaprine (FLEXERIL) 10 mg tablet Take 1 tablet by mouth 3 (three) times a day as needed for muscle spasms 15 tablet 0    HYDROcodone-acetaminophen (NORCO) 5-325 mg per tablet Take 1 tablet by mouth every 4 (four) hours as needed for pain Max Daily Amount: 6 tablets 18 tablet 0    ibuprofen (MOTRIN) 800 mg tablet Take 1 tablet by mouth 3 (three) times a day       No current facility-administered medications for this visit  No Known Allergies   Immunizations: There is no immunization history on file for this patient  Medicare Screening Tests and Risk Assessments:     Maximo Diehl is here for his Subsequent Wellness visit  Last Medicare Wellness visit information reviewed, patient interviewed and updates made to the record today  Health Risk Assessment:  Patient rates overall health as good  Patient feels that their physical health rating is Same  Eyesight was rated as Same  Hearing was rated as Same  Patient feels that their emotional and mental health rating is Same  Pain experienced by patient in the last 7 days has been Some  Patient's pain rating has been 2/10  Patient states that he has experienced no weight loss or gain in last 6 months  Emotional/Mental Health:  Patient has not been feeling nervous/anxious  PHQ-9 Depression Screening:    Frequency of the following problems over the past two weeks:      1  Little interest or pleasure in doing things: 0 - not at all      2  Feeling down, depressed, or hopeless: 0 - not at all  PHQ-2 Score: 0          Broken Bones/Falls: Fall Risk Assessment:    In the past year, patient has experienced: No history of falling in past year          Bladder/Bowel:  Patient has not leaked urine accidently in the last six months  Patient reports no loss of bowel control  Immunizations:  Patient has not had a flu vaccination within the last year  Patient has not received a pneumonia shot  Patient has not received a shingles shot  Patient has not received tetanus/diphtheria shot  Home Safety:  Patient does not have trouble with stairs inside or outside of their home  Patient currently reports that there are no safety hazards present in home, working smoke alarms, working carbon monoxide detectors  Preventative Screenings:   no prostate cancer screen performed, colon cancer screen completed, 6/26/2019  no cholesterol screen completed, no glaucoma eye exam completed    Nutrition:  Current diet: Regular with servings of the following:    Medications:  Patient is not currently taking any over-the-counter supplements  Patient is able to manage medications  Lifestyle Choices:  Patient reports no tobacco use  Patient has not smoked or used tobacco in the past   Patient reports no alcohol use  Patient drives a vehicle  Patient wears seat belt  Current level of exercise of physical activity described by patient as: moderate   Activities of Daily Living:  Can get out of bed by his or her self, able to dress self, able to make own meals, able to do own shopping, able to bathe self, can do own laundry/housekeeping, can manage own money, pay bills and track expenses    Previous Hospitalizations:  No hospitalization or ED visit in past 12 months        Advanced Directives:  Patient has not decided on power of   Patient has spoken to designated power of   Patient has not completed advanced directive  Preventative Screening/Counseling:      Cardiovascular:      General: Screening Current          Diabetes:      General: Screening Current          Colorectal Cancer:      General: Screening Current      Comments: Carolyn mcgovern 6/2019        Prostate Cancer:      General: Risks and Benefits Discussed      Due for labs: PSA          Osteoporosis:      General: Risks and Benefits Discussed and Screening Not Indicated          AAA:  Male patient with age over [de-identified] years  General: Risks and Benefits Discussed      Study: Screening US          Glaucoma:      General: Screening Current          Hepatitis C:      General: Risks and Benefits Discussed and Screening Current      Counseling: has received general HCV counseling      Additional Comments: Was screened in past and report is negative  Advanced Directives:   Patient has no living will for healthcare, does not have durable POA for healthcare, patient does not have an advanced directive  5 wishes given  Immunizations:      Influenza: Patient Declines      Pneumococcal: Patient Declines      Shingrix: Patient Declines      TDAP: Patient Declines      Other Preventative Counseling (Non-Medicare): Increase physical activity and Weight reduction discussed      Referrals:  Referral(s) to: Cardiologist      BMI Counseling: Body mass index is 32 98 kg/m²   Discussed the patient's BMI with him  The BMI is above average  BMI counseling and education was provided to the patient  Nutrition recommendations include reducing portion sizes, decreasing overall calorie intake, 3-5 servings of fruits/vegetables daily and moderation in carbohydrate intake

## 2019-09-03 NOTE — PROGRESS NOTES
Subjective:   Chief Complaint   Patient presents with    Medicare Wellness Visit     subs        Patient ID: Naima Wagner is a 71 y o  male  Medical management-multiple concerns today  1) hyperlipidemia-good levels current medication  2) history of elevated fasting sugar-  3) patient had an injury to his left ribcage while leaning over something  It has gotten better with NSAIDs  4) patient reports some shortness of breath when he exerts himself  He is somewhat concerned about cardiac disease and is wondering about stress test       The following portions of the patient's history were reviewed and updated as appropriate: allergies, current medications, past family history, past medical history, past social history, past surgical history and problem list     Review of Systems   Constitutional: Negative for appetite change, chills, diaphoresis and fever  HENT: Negative for ear pain, rhinorrhea, sinus pressure and sore throat  Eyes: Negative for discharge, redness and itching  Respiratory: Positive for shortness of breath  Negative for cough and wheezing  Cardiovascular: Negative for chest pain and palpitations  Rapid or slow heart rate   Gastrointestinal: Negative for abdominal pain, diarrhea, nausea and vomiting  Neurological: Negative for dizziness, light-headedness and headaches  Psychiatric/Behavioral: Negative for behavioral problems, decreased concentration and dysphoric mood  The patient is not nervous/anxious  Objective:  Vitals:    09/03/19 1529   BP: 126/80   Pulse: 60   Temp: 97 8 °F (36 6 °C)   SpO2: 98%   Weight: 113 kg (250 lb)   Height: 6' 1" (1 854 m)      Physical Exam   Constitutional: He is oriented to person, place, and time  He appears well-developed and well-nourished  No distress  HENT:   Head: Normocephalic and atraumatic     Right Ear: Tympanic membrane, external ear and ear canal normal    Left Ear: Tympanic membrane, external ear and ear canal normal    Nose: No mucosal edema or rhinorrhea  Right sinus exhibits no maxillary sinus tenderness  Left sinus exhibits no maxillary sinus tenderness  Mouth/Throat: Uvula is midline  Mucous membranes are not pale and not dry  Normal dentition  No oropharyngeal exudate  Eyes: Pupils are equal, round, and reactive to light  EOM are normal  Right eye exhibits no discharge  Left eye exhibits no discharge  Neck: Normal range of motion  Neck supple  No thyromegaly present  Cardiovascular: Normal rate, regular rhythm, normal heart sounds and intact distal pulses  No murmur heard  Pulmonary/Chest: Effort normal and breath sounds normal  No respiratory distress  He has no wheezes  He has no rales  Abdominal: Soft  Bowel sounds are normal  He exhibits no distension and no mass  There is no tenderness  Musculoskeletal: Normal range of motion  He exhibits no edema or tenderness  Lymphadenopathy:     He has no cervical adenopathy  Neurological: He is alert and oriented to person, place, and time  No cranial nerve deficit  Skin: He is not diaphoretic  Psychiatric: He has a normal mood and affect  His behavior is normal          Assessment/Plan:    No problem-specific Assessment & Plan notes found for this encounter  Diagnoses and all orders for this visit:    Medicare annual wellness visit, subsequent    BMI 32 0-32 9,adult  -     Ambulatory referral to Cardiology; Future    Hyperlipemia, mixed  -     Comprehensive metabolic panel; Future  -     Lipid Panel with Direct LDL reflex; Future  -     Comprehensive metabolic panel  -     Lipid Panel with Direct LDL reflex  -     Ambulatory referral to Cardiology; Future    Elevated fasting blood sugar  -     Comprehensive metabolic panel; Future  -     Hemoglobin A1C With EAG;  Future  -     Comprehensive metabolic panel  -     Hemoglobin A1C With EAG    Contusion of rib on left side, initial encounter    Dyspnea on exertion  -     Ambulatory referral to Cardiology; Future    Encounter for prostate cancer screening  -     PSA, Total Screen; Future  -     PSA, Total Screen    Screening for AAA (abdominal aortic aneurysm)  -     US abdominal aorta screening aaa; Future        Medical management-repeat labs as ordered    Due to shortness of breath with exertion would refer to Cardiology for evaluation for possible stress test

## 2019-09-07 ENCOUNTER — HOSPITAL ENCOUNTER (OUTPATIENT)
Dept: ULTRASOUND IMAGING | Facility: HOSPITAL | Age: 69
Discharge: HOME | End: 2019-09-07
Payer: COMMERCIAL

## 2019-09-07 DIAGNOSIS — Z13.6 SCREENING FOR AAA (ABDOMINAL AORTIC ANEURYSM): ICD-10-CM

## 2019-09-07 PROCEDURE — 76706 US ABDL AORTA SCREEN AAA: CPT

## 2019-09-16 ENCOUNTER — TELEPHONE (OUTPATIENT)
Dept: FAMILY MEDICINE CLINIC | Facility: CLINIC | Age: 69
End: 2019-09-16

## 2019-09-16 NOTE — TELEPHONE ENCOUNTER
----- Message from Carlton Younger MD sent at 9/14/2019 10:20 AM EDT -----  Call patient with lab result- no AAA

## 2019-12-17 ENCOUNTER — OFFICE VISIT (OUTPATIENT)
Dept: FAMILY MEDICINE CLINIC | Facility: CLINIC | Age: 69
End: 2019-12-17
Payer: COMMERCIAL

## 2019-12-17 VITALS
HEIGHT: 72 IN | SYSTOLIC BLOOD PRESSURE: 126 MMHG | OXYGEN SATURATION: 98 % | BODY MASS INDEX: 34.81 KG/M2 | HEART RATE: 75 BPM | WEIGHT: 257 LBS | DIASTOLIC BLOOD PRESSURE: 82 MMHG

## 2019-12-17 DIAGNOSIS — S46.912A STRAIN OF LEFT SHOULDER, INITIAL ENCOUNTER: Primary | ICD-10-CM

## 2019-12-17 PROCEDURE — 99213 OFFICE O/P EST LOW 20 MIN: CPT | Performed by: FAMILY MEDICINE

## 2019-12-17 NOTE — PROGRESS NOTES
[address]        NAME: Denny Mcwilliams is a 71 y o  male  : 1950    MRN: [id number]  DATE: 2019  TIME: 5:01 PM    Assessment and Plan   No primary diagnosis found  No diagnosis found  No problem-specific Assessment & Plan notes found for this encounter  Patient Instructions     There are no Patient Instructions on file for this visit  Chief Complaint     Chief Complaint   Patient presents with    Pain     in chest radiates to shoulder for 1 week          History of Present Illness        Patient comes in today for evaluation of left-sided anterior chest and shoulder pain  This has been present for approximately 1 week  No recognized injury  Is not related to exertion  There is no associated radiation down the arm, jaw pain, shortness of breath, dyspnea on exertion, or back pain  It was tender to touch initially this has improved  There are no URI symptoms or coughing reported  Review of Systems   Review of Systems   Constitutional: Negative for appetite change, chills, diaphoresis and fever  HENT: Negative for ear pain, rhinorrhea, sinus pressure and sore throat  Eyes: Negative for discharge, redness and itching  Respiratory: Negative for cough, shortness of breath and wheezing  Cardiovascular: Positive for chest pain  Negative for palpitations  Rapid or slow heart rate   Gastrointestinal: Negative for abdominal pain, diarrhea, nausea and vomiting           Current Medications       Current Outpatient Medications:     ibuprofen (MOTRIN) 800 mg tablet, Take 1 tablet by mouth 3 (three) times a day, Disp: , Rfl:     Current Allergies     Allergies as of 2019    (No Known Allergies)            The following portions of the patient's history were reviewed and updated as appropriate: allergies, current medications, past family history, past medical history, past social history, past surgical history and problem list  Past Medical History:   Diagnosis Date    Back pain        History reviewed  No pertinent surgical history  History reviewed  No pertinent family history  Medications have been verified  Objective   /82   Pulse 75   Ht 6' (1 829 m)   Wt 117 kg (257 lb)   SpO2 98%   BMI 34 86 kg/m²        Physical Exam     Physical Exam   Constitutional: He is oriented to person, place, and time  He appears well-developed and well-nourished  No distress  HENT:   Head: Normocephalic and atraumatic  Right Ear: Tympanic membrane and external ear normal  No drainage  Left Ear: Tympanic membrane normal  No drainage  Mouth/Throat: No oropharyngeal exudate  Eyes: Conjunctivae and EOM are normal  Right eye exhibits no discharge  Left eye exhibits no discharge  Neck: Normal range of motion  Neck supple  No thyromegaly present  Cardiovascular: Normal rate, regular rhythm and normal heart sounds  Pulmonary/Chest: Effort normal  No respiratory distress  He has no wheezes  He has no rales  Musculoskeletal:        Left shoulder: He exhibits tenderness  He exhibits normal range of motion, no bony tenderness and no crepitus  Left anterior chest wall  Very minimal tenderness to touch  Also some tenderness across the trapezius on the left side  Left breast there is no palpable masses or tenderness noted  No skin or nipple retraction  Lymphadenopathy:     He has no cervical adenopathy  Neurological: He is alert and oriented to person, place, and time  Skin: Skin is warm and dry  No rash noted  No erythema  SHARP

## 2019-12-17 NOTE — PATIENT INSTRUCTIONS
This appears to be muscular in nature  Perhaps a small on recognized injury  As it is improving I would continue take as needed over-the-counter medications  Warning signs to require further evaluation are discussed  Recheck on an as-needed basis

## 2020-01-24 ENCOUNTER — CONSULT (OUTPATIENT)
Dept: CARDIOLOGY CLINIC | Facility: CLINIC | Age: 70
End: 2020-01-24
Payer: COMMERCIAL

## 2020-01-24 VITALS
WEIGHT: 261 LBS | SYSTOLIC BLOOD PRESSURE: 126 MMHG | DIASTOLIC BLOOD PRESSURE: 80 MMHG | HEIGHT: 72 IN | HEART RATE: 62 BPM | BODY MASS INDEX: 35.35 KG/M2

## 2020-01-24 DIAGNOSIS — E78.2 HYPERLIPEMIA, MIXED: ICD-10-CM

## 2020-01-24 DIAGNOSIS — R07.89 OTHER CHEST PAIN: Primary | ICD-10-CM

## 2020-01-24 DIAGNOSIS — R06.00 DYSPNEA ON EXERTION: ICD-10-CM

## 2020-01-24 PROCEDURE — 99203 OFFICE O/P NEW LOW 30 MIN: CPT | Performed by: INTERNAL MEDICINE

## 2020-01-24 PROCEDURE — 93000 ELECTROCARDIOGRAM COMPLETE: CPT | Performed by: INTERNAL MEDICINE

## 2020-01-24 NOTE — PROGRESS NOTES
Consultation - Cardiology   Pagan B Art 79 y o  male MRN: [id number]    Encounter: [id number]    Assessment/Plan     Assessment:     Atypical Chest Pain    Dyslipidemia      Plan:      Atypical Chest Pain: His symptom is rather atypical  His resting EKG is normal  Recommend treadmill stress echo    For further risk stratification, recommend CT coronary calcium score  Discussed the importance of heart healthy lifestyle  Will call with results  History of Present Illness   Physician Requesting Consult: No att  providers found  Reason for Consult / Principal Problem: chest pain  HPI: Denny Mcwilliams is a 79y o  year old male who presents with chest pain  He has left sided chest pain and also left shoulder pain  His symptom would last for approximately hours at a time  He has some occasional chest tightness and some slight palpitations as well with exertion  He has untreated hyperlipidemia    Review of Systems   Constitution: Positive for malaise/fatigue  HENT: Negative  Eyes: Negative  Cardiovascular: Positive for chest pain and palpitations  Respiratory: Negative  Endocrine: Negative  Hematologic/Lymphatic: Negative  Skin: Negative  Musculoskeletal: Negative  Gastrointestinal: Negative  Genitourinary: Negative  Neurological: Negative  Psychiatric/Behavioral: Negative  Allergic/Immunologic: Negative  Historical Information   Past Medical History:   Diagnosis Date    Back pain      No past surgical history on file  Social History:  Social History     Substance and Sexual Activity   Alcohol Use No     Social History     Substance and Sexual Activity   Drug Use No     Social History     Tobacco Use   Smoking Status Never Smoker   Smokeless Tobacco Never Used       Family History:   No family history on file  Meds/Allergies   No Known Allergies  No current outpatient medications on file      Vitals:   Pulse: 62  Blood Pressue: 126/80  Weight: 118 kg (261 lb)      Physical Exam   Constitutional: He is oriented to person, place, and time  No distress  HENT:   Mouth/Throat: No oropharyngeal exudate  Eyes: No scleral icterus  Neck: No JVD present  Cardiovascular: Normal rate and regular rhythm  Pulmonary/Chest: Effort normal and breath sounds normal  No respiratory distress  He has no wheezes  He has no rales  Abdominal: Soft  Bowel sounds are normal  He exhibits no distension  There is no tenderness  There is no rebound  Musculoskeletal: He exhibits no edema  Neurological: He is alert and oriented to person, place, and time  Skin: Skin is warm and dry  He is not diaphoretic  Psychiatric: He has a normal mood and affect  His behavior is normal        [unfilled]    Invasive Devices     None                 Lab Results   Component Value Date     10/13/2016    CO2 29 10/13/2016    BUN 21 10/13/2016    CREATININE 1 11 10/13/2016    EGFR >60 0 10/13/2016    CALCIUM 9 2 10/13/2016    AST 27 10/13/2016    ALT 26 10/13/2016    ALKPHOS 64 10/13/2016     Lab Results   Component Value Date    WBC 8 20 10/13/2016    HGB 17 4 (H) 10/13/2016     10/13/2016     No components found for: TROP    Imaging:     EKG: NSr  Normal ECG  Counseling / Coordination of Care  Total floor / unit time spent today 45 minutes  Greater than 50% of total time was spent with the patient and / or family counseling and / or coordination of care  A description of the counseling / coordination of care

## 2020-01-29 ENCOUNTER — OFFICE VISIT (OUTPATIENT)
Dept: FAMILY MEDICINE CLINIC | Facility: CLINIC | Age: 70
End: 2020-01-29
Payer: COMMERCIAL

## 2020-01-29 VITALS
DIASTOLIC BLOOD PRESSURE: 78 MMHG | SYSTOLIC BLOOD PRESSURE: 130 MMHG | WEIGHT: 259 LBS | HEIGHT: 71 IN | HEART RATE: 78 BPM | OXYGEN SATURATION: 95 % | BODY MASS INDEX: 36.26 KG/M2

## 2020-01-29 DIAGNOSIS — Z13.9 SCREENING FOR CONDITION: ICD-10-CM

## 2020-01-29 DIAGNOSIS — E78.01 FAMILIAL HYPERCHOLESTEROLEMIA: ICD-10-CM

## 2020-01-29 DIAGNOSIS — R19.7 DIARRHEA, UNSPECIFIED TYPE: Primary | ICD-10-CM

## 2020-01-29 PROCEDURE — 3008F BODY MASS INDEX DOCD: CPT | Performed by: FAMILY MEDICINE

## 2020-01-29 PROCEDURE — 99214 OFFICE O/P EST MOD 30 MIN: CPT | Performed by: FAMILY MEDICINE

## 2020-01-29 PROCEDURE — 1160F RVW MEDS BY RX/DR IN RCRD: CPT | Performed by: FAMILY MEDICINE

## 2020-01-29 NOTE — PROGRESS NOTES
[facility]        NAME: Marilyn York is a 79 y o  male  : 1950    MRN: [id number]  DATE: 2020  TIME: 1:40 PM    Assessment and Plan   Diarrhea, unspecified type [R19 7]  1  Diarrhea, unspecified type           Patient Instructions     Patient Instructions   decr dairy--trial probiotics          Chief Complaint     Chief Complaint   Patient presents with    Diarrhea     [id number]         History of Present Illness       C/o diarrhea 2wks ago---slowly inproving      Review of Systems   Review of Systems   Constitutional: Negative for fatigue, fever and unexpected weight change  HENT: Negative for congestion, sinus pain and sore throat  Eyes: Negative for visual disturbance  Respiratory: Negative for shortness of breath and wheezing  Cardiovascular: Negative for chest pain and palpitations  Gastrointestinal: Positive for diarrhea  Negative for abdominal pain, nausea and vomiting  Musculoskeletal: Negative  Negative for arthralgias and myalgias  Neurological: Negative for syncope, weakness and numbness  Psychiatric/Behavioral: Negative  Negative for confusion, dysphoric mood and suicidal ideas  Current Medications     No current outpatient medications on file  Current Allergies     Allergies as of 2020    (No Known Allergies)            The following portions of the patient's history were reviewed and updated as appropriate: allergies, current medications, past family history, past medical history, past social history, past surgical history and problem list      Past Medical History:   Diagnosis Date    Back pain        History reviewed  No pertinent surgical history  History reviewed  No pertinent family history  Medications have been verified          Objective   /78   Pulse 78   Ht 5' 10 5" (1 791 m)   Wt 117 kg (259 lb)   SpO2 95%   BMI 36 64 kg/m²        Physical Exam     Physical Exam   Constitutional: He is oriented to person, place, and time  Vital signs are normal  He appears well-developed and well-nourished  HENT:   Right Ear: Ear canal normal  Tympanic membrane is not injected  Left Ear: Ear canal normal  Tympanic membrane is not injected  Nose: Nose normal    Mouth/Throat: Oropharynx is clear and moist    Eyes: Pupils are equal, round, and reactive to light  Conjunctivae and EOM are normal  Right eye exhibits no discharge  Left eye exhibits no discharge  Neck: Normal range of motion  Neck supple  No thyromegaly present  Cardiovascular: Normal rate, regular rhythm and normal heart sounds  No murmur heard  Pulmonary/Chest: Effort normal and breath sounds normal  No respiratory distress  He has no wheezes  Abdominal: Soft  Bowel sounds are normal  He exhibits no distension  There is no tenderness  Musculoskeletal: Normal range of motion  Lymphadenopathy:     He has no cervical adenopathy  Neurological: He is alert and oriented to person, place, and time  He has normal strength and normal reflexes  He is not disoriented  No sensory deficit  Gait normal    Skin: Skin is warm and dry  Psychiatric: He has a normal mood and affect  His speech is normal and behavior is normal  Judgment and thought content normal  Cognition and memory are normal        BMI Counseling: Body mass index is 36 64 kg/m²  The BMI is above normal  Nutrition recommendations include reducing portion sizes

## 2020-02-04 ENCOUNTER — CONSULT (OUTPATIENT)
Dept: GASTROENTEROLOGY | Facility: CLINIC | Age: 70
End: 2020-02-04
Payer: COMMERCIAL

## 2020-02-04 VITALS
DIASTOLIC BLOOD PRESSURE: 80 MMHG | HEART RATE: 65 BPM | BODY MASS INDEX: 34.99 KG/M2 | SYSTOLIC BLOOD PRESSURE: 122 MMHG | HEIGHT: 73 IN | WEIGHT: 264 LBS

## 2020-02-04 DIAGNOSIS — R19.7 DIARRHEA, UNSPECIFIED TYPE: Primary | ICD-10-CM

## 2020-02-04 DIAGNOSIS — Z12.11 SCREENING FOR COLON CANCER: ICD-10-CM

## 2020-02-04 PROCEDURE — 3008F BODY MASS INDEX DOCD: CPT | Performed by: INTERNAL MEDICINE

## 2020-02-04 PROCEDURE — 99203 OFFICE O/P NEW LOW 30 MIN: CPT | Performed by: INTERNAL MEDICINE

## 2020-02-04 NOTE — PROGRESS NOTES
[id number] - Outpatient Consultation  Jing Núñez 79 y o  male MRN: [id number]  Encounter: [id number]    ASSESSMENT AND PLAN:      1  Diarrhea, unspecified type  I suspect this was a self-limiting viral gastroenteritis probably food-borne we discussed this at length  He has had colon guard 6 months ago I do not feel that he definitely needs a colonoscopy he wants to do something more  Will check his stool for blood check a blood count and iron studies of all these are normal I would not pursue it  If he is anemic, iron deficient or has blood in his stool he should have colonoscopy he is agreeable  - CBC; Future  - Iron Panel (Includes Ferritin, Iron Sat%, Iron, and TIBC); Future  - Occult blood 1-3, stool; Future    2  Screening for colon cancer  As above colon guard 6 months ago      Followup Appointment:  MAGDALENA valerio   ______________________________________________________________________    Chief Complaint   Patient presents with    Change in Bowel Habits    Bloated       HPI:   Jing Núñez is a 79y o  year old male who presents with a 2 week diarrheal illness  Began rather suddenly after eating certain food  This improved spontaneously then got worse for a week and now is back to normal he is having normal formed stools no blood with any of this no abdominal pain no weight loss no upper tract symptoms    Historical Information   Past Medical History:   Diagnosis Date    Back pain      No past surgical history on file  Social History     Substance and Sexual Activity   Alcohol Use No    Comment: once a year     Social History     Substance and Sexual Activity   Drug Use No     Social History     Tobacco Use   Smoking Status Never Smoker   Smokeless Tobacco Never Used     Family History   Problem Relation Age of Onset    Colon cancer Neg Hx     Colon polyps Neg Hx        Meds/Allergies   No current outpatient medications on file      No Known Allergies    PHYSICAL EXAM: Blood pressure 122/80, pulse 65, height 6' 1" (1 854 m), weight 120 kg (264 lb)  Body mass index is 34 83 kg/m²  General Appearance: NAD, cooperative, alert  Eyes: Anicteric, PERRLA, EOMI  ENT:  Normocephalic, atraumatic, normal mucosa  Neck:  Supple, symmetrical, trachea midline,   Resp:  Clear to auscultation bilaterally; no rales, rhonchi or wheezing; respirations unlabored   CV:  S1 S2, Regular rate and rhythm; no murmur, rub, or gallop  GI:  Soft, non-tender, non-distended; normal bowel sounds; no masses, no organomegaly   Rectal: Deferred  Musculoskeletal: No cyanosis, clubbing or edema  Normal ROM  Skin:  No jaundice, rashes, or lesions   Heme/Lymph: No palpable cervical lymphadenopathy  Psych: Normal affect, good eye contact  Neuro: No gross deficits, AAOx3    Lab Results:   Lab Results   Component Value Date    WBC 8 20 10/13/2016    HGB 17 4 (H) 10/13/2016    HCT 51 1 (H) 10/13/2016    MCV 89 10/13/2016     10/13/2016     Lab Results   Component Value Date    K 4 1 10/13/2016     10/13/2016    CO2 29 10/13/2016    BUN 21 10/13/2016    CREATININE 1 11 10/13/2016    CALCIUM 9 2 10/13/2016    AST 27 10/13/2016    ALT 26 10/13/2016    ALKPHOS 64 10/13/2016    EGFR >60 0 10/13/2016     No results found for: IRON, TIBC, FERRITIN  No results found for: LIPASE    Radiology Results:   No results found  REVIEW OF SYSTEMS:    CONSTITUTIONAL: Denies any fever, chills, rigors, and weight loss  HEENT: No earache or tinnitus  Denies hearing loss or visual disturbances  CARDIOVASCULAR: No chest pain or palpitations  RESPIRATORY: Denies any cough, hemoptysis, shortness of breath or dyspnea on exertion  GASTROINTESTINAL: As noted in the History of Present Illness  GENITOURINARY: No problems with urination  Denies any hematuria or dysuria  NEUROLOGIC: No dizziness or vertigo, denies headaches  MUSCULOSKELETAL: Denies any muscle or joint pain  SKIN: Denies skin rashes or itching  ENDOCRINE: Denies excessive thirst  Denies intolerance to heat or cold  PSYCHOSOCIAL: Denies depression or anxiety  Denies any recent memory loss

## 2020-06-19 ENCOUNTER — LAB (OUTPATIENT)
Dept: LAB | Facility: HOSPITAL | Age: 70
End: 2020-06-19
Attending: INTERNAL MEDICINE
Payer: COMMERCIAL

## 2020-06-19 DIAGNOSIS — R19.7 DIARRHEA, UNSPECIFIED TYPE: Primary | ICD-10-CM

## 2020-06-19 LAB
ERYTHROCYTE [DISTWIDTH] IN BLOOD BY AUTOMATED COUNT: 12.6 % (ref 11.6–15.1)
FERRITIN SERPL-MCNC: 182 NG/ML (ref 8–388)
HCT VFR BLD AUTO: 47.9 % (ref 36.5–49.3)
HEMOCCULT STL QL IA: NEGATIVE
HGB BLD-MCNC: 15.4 G/DL (ref 12–17)
IRON SATN MFR SERPL: 49 %
IRON SERPL-MCNC: 119 UG/DL (ref 65–175)
MCH RBC QN AUTO: 30.3 PG (ref 26.8–34.3)
MCHC RBC AUTO-ENTMCNC: 32.2 G/DL (ref 31.4–37.4)
MCV RBC AUTO: 94 FL (ref 82–98)
PLATELET # BLD AUTO: 214 THOUSANDS/UL (ref 149–390)
PMV BLD AUTO: 11.3 FL (ref 8.9–12.7)
RBC # BLD AUTO: 5.08 MILLION/UL (ref 3.88–5.62)
TIBC SERPL-MCNC: 245 UG/DL (ref 250–450)
WBC # BLD AUTO: 5.91 THOUSAND/UL (ref 4.31–10.16)

## 2020-06-19 PROCEDURE — 36415 COLL VENOUS BLD VENIPUNCTURE: CPT

## 2020-06-19 PROCEDURE — 83550 IRON BINDING TEST: CPT

## 2020-06-19 PROCEDURE — 82728 ASSAY OF FERRITIN: CPT

## 2020-06-19 PROCEDURE — 85027 COMPLETE CBC AUTOMATED: CPT

## 2020-06-19 PROCEDURE — 83540 ASSAY OF IRON: CPT

## 2020-06-19 PROCEDURE — G0328 FECAL BLOOD SCRN IMMUNOASSAY: HCPCS

## 2020-12-08 ENCOUNTER — HOSPITAL ENCOUNTER (EMERGENCY)
Facility: HOSPITAL | Age: 70
Discharge: HOME | End: 2020-12-08
Attending: EMERGENCY MEDICINE | Admitting: EMERGENCY MEDICINE
Payer: COMMERCIAL

## 2020-12-08 ENCOUNTER — APPOINTMENT (EMERGENCY)
Dept: RADIOLOGY | Facility: HOSPITAL | Age: 70
End: 2020-12-08
Payer: COMMERCIAL

## 2020-12-08 VITALS
SYSTOLIC BLOOD PRESSURE: 140 MMHG | RESPIRATION RATE: 18 BRPM | HEIGHT: 73 IN | WEIGHT: 260 LBS | HEART RATE: 92 BPM | OXYGEN SATURATION: 96 % | TEMPERATURE: 101.1 F | DIASTOLIC BLOOD PRESSURE: 77 MMHG | BODY MASS INDEX: 34.46 KG/M2

## 2020-12-08 DIAGNOSIS — R50.9 FEVER OF UNKNOWN ORIGIN: Primary | ICD-10-CM

## 2020-12-08 LAB
ALBUMIN SERPL BCP-MCNC: 3.9 G/DL (ref 3.5–5)
ALP SERPL-CCNC: 77 U/L (ref 46–116)
ALT SERPL W P-5'-P-CCNC: 22 U/L (ref 12–78)
ANION GAP SERPL CALCULATED.3IONS-SCNC: 8 MMOL/L (ref 4–13)
APTT PPP: 33 SECONDS (ref 23–37)
AST SERPL W P-5'-P-CCNC: 22 U/L (ref 5–45)
BACTERIA UR QL AUTO: ABNORMAL /HPF
BASOPHILS # BLD AUTO: 0.06 THOUSANDS/ΜL (ref 0–0.1)
BASOPHILS NFR BLD AUTO: 1 % (ref 0–1)
BILIRUB SERPL-MCNC: 0.8 MG/DL (ref 0.2–1)
BILIRUB UR QL STRIP: NEGATIVE
BUN SERPL-MCNC: 24 MG/DL (ref 5–25)
CALCIUM SERPL-MCNC: 9.4 MG/DL (ref 8.3–10.1)
CAOX CRY URNS QL MICRO: ABNORMAL /HPF
CHLORIDE SERPL-SCNC: 100 MMOL/L (ref 100–108)
CLARITY UR: CLEAR
CO2 SERPL-SCNC: 28 MMOL/L (ref 21–32)
COLOR UR: YELLOW
CREAT SERPL-MCNC: 1.01 MG/DL (ref 0.6–1.3)
EOSINOPHIL # BLD AUTO: 0.08 THOUSAND/ΜL (ref 0–0.61)
EOSINOPHIL NFR BLD AUTO: 1 % (ref 0–6)
ERYTHROCYTE [DISTWIDTH] IN BLOOD BY AUTOMATED COUNT: 12.4 % (ref 11.6–15.1)
FLUAV RNA RESP QL NAA+PROBE: NEGATIVE
FLUBV RNA RESP QL NAA+PROBE: NEGATIVE
GFR SERPL CREATININE-BSD FRML MDRD: 75 ML/MIN/1.73SQ M
GLUCOSE SERPL-MCNC: 90 MG/DL (ref 65–140)
GLUCOSE UR STRIP-MCNC: NEGATIVE MG/DL
HCT VFR BLD AUTO: 47.6 % (ref 36.5–49.3)
HGB BLD-MCNC: 15.4 G/DL (ref 12–17)
HGB UR QL STRIP.AUTO: ABNORMAL
IMM GRANULOCYTES # BLD AUTO: 0.03 THOUSAND/UL (ref 0–0.2)
IMM GRANULOCYTES NFR BLD AUTO: 0 % (ref 0–2)
INR PPP: 1.04 (ref 0.84–1.19)
KETONES UR STRIP-MCNC: ABNORMAL MG/DL
LACTATE SERPL-SCNC: 0.9 MMOL/L (ref 0.5–2)
LEUKOCYTE ESTERASE UR QL STRIP: NEGATIVE
LYMPHOCYTES # BLD AUTO: 0.9 THOUSANDS/ΜL (ref 0.6–4.47)
LYMPHOCYTES NFR BLD AUTO: 9 % (ref 14–44)
MCH RBC QN AUTO: 30.3 PG (ref 26.8–34.3)
MCHC RBC AUTO-ENTMCNC: 32.4 G/DL (ref 31.4–37.4)
MCV RBC AUTO: 94 FL (ref 82–98)
MONOCYTES # BLD AUTO: 0.68 THOUSAND/ΜL (ref 0.17–1.22)
MONOCYTES NFR BLD AUTO: 7 % (ref 4–12)
MUCOUS THREADS UR QL AUTO: ABNORMAL
NEUTROPHILS # BLD AUTO: 8.42 THOUSANDS/ΜL (ref 1.85–7.62)
NEUTS SEG NFR BLD AUTO: 82 % (ref 43–75)
NITRITE UR QL STRIP: NEGATIVE
NON-SQ EPI CELLS URNS QL MICRO: ABNORMAL /HPF
NRBC BLD AUTO-RTO: 0 /100 WBCS
PH UR STRIP.AUTO: 5 [PH]
PLATELET # BLD AUTO: 205 THOUSANDS/UL (ref 149–390)
PMV BLD AUTO: 10.6 FL (ref 8.9–12.7)
POTASSIUM SERPL-SCNC: 4.6 MMOL/L (ref 3.5–5.3)
PROT SERPL-MCNC: 8 G/DL (ref 6.4–8.2)
PROT UR STRIP-MCNC: NEGATIVE MG/DL
PROTHROMBIN TIME: 13.6 SECONDS (ref 11.6–14.5)
RBC # BLD AUTO: 5.08 MILLION/UL (ref 3.88–5.62)
RBC #/AREA URNS AUTO: ABNORMAL /HPF
RSV RNA RESP QL NAA+PROBE: NEGATIVE
SARS-COV-2 RNA RESP QL NAA+PROBE: NEGATIVE
SODIUM SERPL-SCNC: 136 MMOL/L (ref 136–145)
SP GR UR STRIP.AUTO: >=1.03 (ref 1–1.03)
UROBILINOGEN UR QL STRIP.AUTO: 0.2 E.U./DL
WBC # BLD AUTO: 10.17 THOUSAND/UL (ref 4.31–10.16)
WBC #/AREA URNS AUTO: ABNORMAL /HPF

## 2020-12-08 PROCEDURE — 83605 ASSAY OF LACTIC ACID: CPT | Performed by: EMERGENCY MEDICINE

## 2020-12-08 PROCEDURE — 36415 COLL VENOUS BLD VENIPUNCTURE: CPT | Performed by: EMERGENCY MEDICINE

## 2020-12-08 PROCEDURE — 84145 PROCALCITONIN (PCT): CPT | Performed by: EMERGENCY MEDICINE

## 2020-12-08 PROCEDURE — 71045 X-RAY EXAM CHEST 1 VIEW: CPT

## 2020-12-08 PROCEDURE — 99284 EMERGENCY DEPT VISIT MOD MDM: CPT | Performed by: EMERGENCY MEDICINE

## 2020-12-08 PROCEDURE — 85025 COMPLETE CBC W/AUTO DIFF WBC: CPT | Performed by: EMERGENCY MEDICINE

## 2020-12-08 PROCEDURE — 80053 COMPREHEN METABOLIC PANEL: CPT | Performed by: EMERGENCY MEDICINE

## 2020-12-08 PROCEDURE — 85730 THROMBOPLASTIN TIME PARTIAL: CPT | Performed by: EMERGENCY MEDICINE

## 2020-12-08 PROCEDURE — 87040 BLOOD CULTURE FOR BACTERIA: CPT | Performed by: EMERGENCY MEDICINE

## 2020-12-08 PROCEDURE — 85610 PROTHROMBIN TIME: CPT | Performed by: EMERGENCY MEDICINE

## 2020-12-08 PROCEDURE — 0241U HB NFCT DS VIR RESP RNA 4 TRGT: CPT | Performed by: EMERGENCY MEDICINE

## 2020-12-08 PROCEDURE — 99284 EMERGENCY DEPT VISIT MOD MDM: CPT

## 2020-12-08 PROCEDURE — 81001 URINALYSIS AUTO W/SCOPE: CPT | Performed by: EMERGENCY MEDICINE

## 2020-12-09 LAB — PROCALCITONIN SERPL-MCNC: 0.14 NG/ML

## 2020-12-14 ENCOUNTER — TELEMEDICINE (OUTPATIENT)
Dept: FAMILY MEDICINE CLINIC | Facility: CLINIC | Age: 70
End: 2020-12-14
Payer: COMMERCIAL

## 2020-12-14 DIAGNOSIS — J06.9 VIRAL UPPER RESPIRATORY TRACT INFECTION: Primary | ICD-10-CM

## 2020-12-14 DIAGNOSIS — B34.9 VIRAL INFECTION, UNSPECIFIED: ICD-10-CM

## 2020-12-14 LAB
BACTERIA BLD CULT: NORMAL
BACTERIA BLD CULT: NORMAL

## 2020-12-14 PROCEDURE — 1036F TOBACCO NON-USER: CPT | Performed by: FAMILY MEDICINE

## 2020-12-14 PROCEDURE — 99213 OFFICE O/P EST LOW 20 MIN: CPT | Performed by: FAMILY MEDICINE

## 2020-12-14 PROCEDURE — 1160F RVW MEDS BY RX/DR IN RCRD: CPT | Performed by: FAMILY MEDICINE

## 2021-01-05 ENCOUNTER — TELEMEDICINE (OUTPATIENT)
Dept: FAMILY MEDICINE CLINIC | Facility: CLINIC | Age: 71
End: 2021-01-05
Payer: COMMERCIAL

## 2021-01-05 VITALS — BODY MASS INDEX: 34.46 KG/M2 | HEIGHT: 73 IN | WEIGHT: 260 LBS

## 2021-01-05 DIAGNOSIS — B34.9 VIRAL INFECTION, UNSPECIFIED: Primary | ICD-10-CM

## 2021-01-05 PROCEDURE — 1160F RVW MEDS BY RX/DR IN RCRD: CPT | Performed by: FAMILY MEDICINE

## 2021-01-05 PROCEDURE — 3008F BODY MASS INDEX DOCD: CPT | Performed by: FAMILY MEDICINE

## 2021-01-05 PROCEDURE — 99213 OFFICE O/P EST LOW 20 MIN: CPT | Performed by: FAMILY MEDICINE

## 2021-01-05 NOTE — PROGRESS NOTES
COVID-19 Virtual Visit     Assessment/Plan:    Problem List Items Addressed This Visit     None         Disposition:     I have spent 15 minutes directly with the patient  Greater than 50% of this time was spent in counseling/coordination of care regarding: prognosis, patient and family education and impressions  If you choose to have a COVID-19 antibody test, you should understand some important limitations of this test   Antibody tests detect the body's immune response to infection rather than detecting the virus itself   It can take weeks for antibodies to show up in the blood   For this reason, antibody tests are not used to detect or manage infection  They may be better for tracking infections in the community  Current antibody tests have not undergone the usual strict FDA approval process  The FDA decided to make it easier to have more tests available  The result is that these new tests may not be reliable  Since COVID-19 antibody testing is so new, we do not know how accurate it is   You can have a positive test and have not actually been infected  You also can have a negative test even though you might have been infected  There are other coronaviruses that are known to cause the common cold  Many people may have antibodies to these other viruses that could make the COVID-19 antibody test positive   More importantly, even if you test positive, this does not necessarily mean you are immune to the virus  Even so, your [facility] provider understands that you may still want a COVID-19 antibody test and can order this test for you   It is important that you review the results with your provider and decide together how to use them  Encounter provider Kobe Kaur MD    Provider located at [address] [place] [id number]    Recent Visits  No visits were found meeting these conditions     Showing recent visits within past 7 days and meeting all other requirements     Today's Visits  Date Type Provider Dept   01/05/21 [id number], MD  [address] [facility] today's visits and meeting all other requirements     Future Appointments  No visits were found meeting these conditions  Showing future appointments within next 150 days and meeting all other requirements      This virtual check-in was done via [employer] and patient was informed that this is not a secure, HIPAA-compliant platform  He agrees to proceed  Patient agrees to participate in a virtual check in via telephone or video visit instead of presenting to the office to address urgent/immediate medical needs  Patient is aware this is a billable service  After connecting through [facility], the patient was identified by name and date of birth  Delonte Oshea was informed that this was a telemedicine visit and that the exam was being conducted confidentially over secure lines  My office door was closed  No one else was in the room  Delonte Oshea acknowledged consent and understanding of privacy and security of the telemedicine visit  I informed the patient that I have reviewed his record in Epic and presented the opportunity for him to ask any questions regarding the visit today  The patient agreed to participate  Subjective: Delonte Oshea is a 79 y o  male who has been screened for COVID-19  Symptom change since last report: resolving  Patient is asymptomatic  Date of symptom onset: 12/8/2020    Patient denies fever, chills, fatigue, malaise, congestion, rhinorrhea, sore throat, anosmia, loss of taste, cough, shortness of breath, chest tightness, abdominal pain, nausea, vomiting, diarrhea, myalgias and headaches  Patient's current about possible false negative coronavirus test on illness in December      Lab Results   Component Value Date    SARSCOV2 Negative 12/08/2020     Past Medical History:   Diagnosis Date    Back pain      History reviewed  No pertinent surgical history  No current outpatient medications on file  No current facility-administered medications for this visit  No Known Allergies    Review of Systems   Constitutional: Negative for chills, fatigue and fever  HENT: Negative for congestion, rhinorrhea and sore throat  Respiratory: Negative for cough, chest tightness and shortness of breath  Gastrointestinal: Negative for abdominal pain, diarrhea, nausea and vomiting  Musculoskeletal: Negative for myalgias  Neurological: Negative for headaches  Objective:    Vitals:    01/05/21 1114   Weight: 118 kg (260 lb)   Height: 6' 1" (1 854 m)       Physical Exam  Constitutional:       General: He is not in acute distress  Appearance: Normal appearance  He is ill-appearing  HENT:      Head: Normocephalic and atraumatic  Nose: Nose normal    Pulmonary:      Effort: Pulmonary effort is normal  No respiratory distress  Neurological:      Mental Status: He is alert  Psychiatric:         Mood and Affect: Mood normal          Behavior: Behavior normal        VIRTUAL VISIT [address] acknowledges that he has consented to an online visit or consultation  He understands that the online visit is based solely on information provided by him, and that, in the absence of a face-to-face physical evaluation by the physician, the diagnosis he receives is both limited and provisional in terms of accuracy and completeness  This is not intended to replace a full medical face-to-face evaluation by the physician  Ej Clay understands and accepts these terms

## 2021-01-19 ENCOUNTER — TELEPHONE (OUTPATIENT)
Dept: FAMILY MEDICINE CLINIC | Facility: CLINIC | Age: 71
End: 2021-01-19

## 2021-01-19 LAB — SARS-COV-2 IGG SERPL QL IA: NEGATIVE

## 2021-01-19 NOTE — TELEPHONE ENCOUNTER
----- Message from Yris Mercedes MD sent at 1/19/2021  8:10 AM EST -----  Call patient with lab result-COVID antibody testing negative

## 2021-01-21 ENCOUNTER — IMMUNIZATIONS (OUTPATIENT)
Dept: FAMILY MEDICINE CLINIC | Facility: HOSPITAL | Age: 71
End: 2021-01-21

## 2021-01-21 DIAGNOSIS — Z23 ENCOUNTER FOR IMMUNIZATION: Primary | ICD-10-CM

## 2021-01-21 PROCEDURE — 0011A SARS-COV-2 / COVID-19 MRNA VACCINE (MODERNA) 100 MCG: CPT

## 2021-01-21 PROCEDURE — 91301 SARS-COV-2 / COVID-19 MRNA VACCINE (MODERNA) 100 MCG: CPT

## 2021-02-19 ENCOUNTER — IMMUNIZATIONS (OUTPATIENT)
Dept: FAMILY MEDICINE CLINIC | Facility: HOSPITAL | Age: 71
End: 2021-02-19

## 2021-02-19 DIAGNOSIS — Z23 ENCOUNTER FOR IMMUNIZATION: Primary | ICD-10-CM

## 2021-02-19 PROCEDURE — 0012A SARS-COV-2 / COVID-19 MRNA VACCINE (MODERNA) 100 MCG: CPT

## 2021-02-19 PROCEDURE — 91301 SARS-COV-2 / COVID-19 MRNA VACCINE (MODERNA) 100 MCG: CPT

## 2021-02-20 ENCOUNTER — NURSE TRIAGE (OUTPATIENT)
Dept: OTHER | Facility: OTHER | Age: 71
End: 2021-02-20

## 2021-02-20 NOTE — TELEPHONE ENCOUNTER
Regardin   temp after having my 2nd vaccine   ----- Message from Ajay Mendez sent at 2021  3:03 PM EST -----  "I had my 2nd shot on yesterday  / now today I have a temp of 101 6 with some aches and pains"

## 2021-02-20 NOTE — TELEPHONE ENCOUNTER
Reason for Disposition   COVID-19 vaccine, systemic reactions (e g , fatigue, fever, muscle aches), questions about    Answer Assessment - Initial Assessment Questions  1  MAIN CONCERN OR SYMPTOM:  "What is your main concern right now?" "What question do you have?" "What's the main symptom you're worried about?" (e g , fever, pain, redness, swelling)      Fever of 100 9 at 1520 with mouth thermometer, aching at the vaccine site in a right arm  2  VACCINE: "What vaccination did you receive?" "Is this your first or second shot?" (e g , none; [facility] [facility], other)      Moderna   3  SYMPTOM ONSET: "When did the sym begin?" (e g , not relevant; hours, days)       Today in a morning   4  SYMPTOM SEVERITY: "How bad is it?"       Moderate   5  FEVER: "Is there a fever?" If so, ask: "What is it, how was it measured, and when did it start?"       Fever started today from 99 9-101 0   6  PAST REACTIONS: "Have you reacted to immunizations before?" If so, ask: "What happened?"      Patient had some stiffness in an arm where first dose of vaccine was injected   7   OTHER SYMPTOMS: "Do you have any other symptoms?"      Some body aches, fatigue    Protocols used: CORONAVIRUS (COVID-19) VACCINE QUESTIONS AND REACTIONS-[facility]

## 2021-03-25 ENCOUNTER — OFFICE VISIT (OUTPATIENT)
Dept: FAMILY MEDICINE CLINIC | Facility: CLINIC | Age: 71
End: 2021-03-25
Payer: COMMERCIAL

## 2021-03-25 VITALS
TEMPERATURE: 97.1 F | WEIGHT: 250 LBS | HEIGHT: 73 IN | SYSTOLIC BLOOD PRESSURE: 134 MMHG | DIASTOLIC BLOOD PRESSURE: 78 MMHG | HEART RATE: 77 BPM | BODY MASS INDEX: 33.13 KG/M2 | OXYGEN SATURATION: 97 %

## 2021-03-25 DIAGNOSIS — E78.2 HYPERLIPEMIA, MIXED: ICD-10-CM

## 2021-03-25 DIAGNOSIS — R73.01 ELEVATED FASTING BLOOD SUGAR: ICD-10-CM

## 2021-03-25 DIAGNOSIS — M79.674 PAIN OF TOE OF RIGHT FOOT: ICD-10-CM

## 2021-03-25 DIAGNOSIS — L60.3 DYSTROPHIC NAIL: Primary | ICD-10-CM

## 2021-03-25 DIAGNOSIS — Z12.5 ENCOUNTER FOR PROSTATE CANCER SCREENING: ICD-10-CM

## 2021-03-25 PROCEDURE — 1123F ACP DISCUSS/DSCN MKR DOCD: CPT | Performed by: FAMILY MEDICINE

## 2021-03-25 PROCEDURE — 99214 OFFICE O/P EST MOD 30 MIN: CPT | Performed by: FAMILY MEDICINE

## 2021-03-25 NOTE — PATIENT INSTRUCTIONS
This appears to be more irritation or your toe  You could try some over-the-counter Lamisil a T once daily for 2-3 weeks and see if that would help with the redness  Also could pad the toe to avoid rubbing against issue  I will send out the nail sample for fungal cultures  Get other labs to include CMP, lipid panel, and A1c

## 2021-03-25 NOTE — PROGRESS NOTES
[id number]        NAME: Wendi Schilling is a 70 y o  male  : 1950    MRN: [id number]  DATE: 2021  TIME: 3:37 PM    Assessment and Plan   Dystrophic nail [L60 3]  1  Dystrophic nail     2  Hyperlipemia, mixed  Comprehensive metabolic panel    Lipid Panel with Direct LDL reflex   3  Elevated fasting blood sugar  Comprehensive metabolic panel    Hemoglobin A1C       No problem-specific Assessment & Plan notes found for this encounter  Patient Instructions     There are no Patient Instructions on file for this visit  Chief Complaint     Chief Complaint   Patient presents with    Physical Exam     HM  athletic feet          History of Present Illness         Patient comes in today with 2 problems right foot  1 dystrophic nail on the great toe  He is concerned about toenail fungus  He also has irritation on the 5th toe  There is some redness there does not particularly itch  He has changed issues recently as he is concerned that potentially it is related to that but he was also worried about fungus infection  Hyperlipidemia-no recent levels  Impaired fasting glucose -no recent A1c done  No current medications  Did have elevated sugar 1-2 years ago 134  Brother does have diabetes  Patient also wish to continue to being screen for prostate cancer with PSAs as his brother also has prostate cancer  Review of Systems   Review of Systems   Constitutional: Negative for appetite change, chills, diaphoresis and fatigue  Respiratory: Negative for cough, shortness of breath, wheezing and stridor  Cardiovascular: Negative for chest pain, palpitations and leg swelling  Musculoskeletal: Negative for arthralgias, joint swelling and myalgias  Skin: Positive for color change and rash  Negative for wound  Psychiatric/Behavioral: Negative for behavioral problems, dysphoric mood and sleep disturbance  The patient is not nervous/anxious  Current Medications     No current outpatient medications on file  Current Allergies     Allergies as of 03/25/2021    (No Known Allergies)            The following portions of the patient's history were reviewed and updated as appropriate: allergies, current medications, past family history, past medical history, past social history, past surgical history and problem list      Past Medical History:   Diagnosis Date    Back pain        History reviewed  No pertinent surgical history  Family History   Problem Relation Age of Onset    Colon cancer Neg Hx     Colon polyps Neg Hx          Medications have been verified  Objective   /78   Pulse 77   Temp (!) 97 1 °F (36 2 °C) (Temporal)   Ht 6' 1" (1 854 m)   Wt 113 kg (250 lb)   SpO2 97%   BMI 32 98 kg/m²        Physical Exam     Physical Exam  Constitutional:       General: He is not in acute distress  Appearance: Normal appearance  He is not ill-appearing  HENT:      Head: Normocephalic and atraumatic  Nose: Nose normal    Eyes:      Extraocular Movements: Extraocular movements intact  Pupils: Pupils are equal, round, and reactive to light  Cardiovascular:      Rate and Rhythm: Normal rate and regular rhythm  Heart sounds: Normal heart sounds  Pulmonary:      Effort: Pulmonary effort is normal  No respiratory distress  Breath sounds: Normal breath sounds  Musculoskeletal:      Comments:   Right pinky toe- there is erythema with some tenderness  No definite fungal rashes noted  Right great toe toenail is markedly dystrophic  Not particularly tender or red  Neurological:      Mental Status: He is alert

## 2021-03-26 NOTE — RESULT ENCOUNTER NOTE
Call patient with lab result-  Staining of the nail showed no fungus  Culture will take 4-6 weeks to return

## 2021-03-27 ENCOUNTER — TELEPHONE (OUTPATIENT)
Dept: FAMILY MEDICINE CLINIC | Facility: CLINIC | Age: 71
End: 2021-03-27

## 2021-03-27 NOTE — TELEPHONE ENCOUNTER
----- Message from Yris Mercedes MD sent at 3/26/2021  4:23 PM EDT -----  Call patient with lab result-  Staining of the nail showed no fungus  Culture will take 4-6 weeks to return

## 2021-04-05 ENCOUNTER — TELEPHONE (OUTPATIENT)
Dept: FAMILY MEDICINE CLINIC | Facility: CLINIC | Age: 71
End: 2021-04-05

## 2021-04-05 NOTE — TELEPHONE ENCOUNTER
----- Message from Oracio Babin MD sent at 4/5/2021  9:44 AM EDT -----  Call patient with lab result-  Preliminary report shows no fungus  No growth to fungus after 1 week  They will continue to grow this  I would hold off on antifungal medications for the toenails  He could try 1 of the over-the-counter topical nail preparations to see if that would help  Maame Calderon, etc

## 2021-04-05 NOTE — RESULT ENCOUNTER NOTE
Call patient with lab result-  Preliminary report shows no fungus  No growth to fungus after 1 week  They will continue to grow this  I would hold off on antifungal medications for the toenails  He could try 1 of the over-the-counter topical nail preparations to see if that would help  Gina Bazzi, etc

## 2021-04-16 ENCOUNTER — TELEPHONE (OUTPATIENT)
Dept: FAMILY MEDICINE CLINIC | Facility: CLINIC | Age: 71
End: 2021-04-16

## 2021-04-16 NOTE — RESULT ENCOUNTER NOTE
Call patient with lab result-  Still no growth on fungus after 3 weeks  They will continue to look at that for total of 6 weeks  This was suggest the nail is not related to fungal infection  He could try 1 of the over-the-counter dystrophic nail liquids to see if that would help

## 2021-04-16 NOTE — TELEPHONE ENCOUNTER
----- Message from Rosana Gracia MD sent at 4/16/2021 10:55 AM EDT -----  Call patient with lab result-  Still no growth on fungus after 3 weeks  They will continue to look at that for total of 6 weeks  This was suggest the nail is not related to fungal infection  He could try 1 of the over-the-counter dystrophic nail liquids to see if that would help

## 2021-09-23 DIAGNOSIS — E78.2 HYPERLIPEMIA, MIXED: Primary | ICD-10-CM

## 2021-09-23 DIAGNOSIS — R07.89 OTHER CHEST PAIN: ICD-10-CM

## 2021-10-28 ENCOUNTER — HOSPITAL ENCOUNTER (OUTPATIENT)
Dept: NON INVASIVE DIAGNOSTICS | Age: 71
Discharge: HOME | End: 2021-10-28
Payer: COMMERCIAL

## 2021-10-28 DIAGNOSIS — R07.89 OTHER CHEST PAIN: ICD-10-CM

## 2021-10-28 DIAGNOSIS — E78.2 HYPERLIPEMIA, MIXED: ICD-10-CM

## 2021-10-28 LAB
CHEST PAIN STATEMENT: NORMAL
MAX DIASTOLIC BP: 64 MMHG
MAX HEART RATE: 137 BPM
MAX HEART RATE: 80 BPM
MAX HR PERCENT: 91 %
MAX PREDICTED HEART RATE: 149 BPM
MAX PREDICTED HEART RATE: 149 BPM
MAX. SYSTOLIC BP: 159 MMHG
PROTOCOL NAME: NORMAL
PROTOCOL NAME: NORMAL
REASON FOR TERMINATION: NORMAL
REASON FOR TERMINATION: NORMAL
SL CV LV EF: 65
SL CV STRESS STAGE REACHED: 2
STRESS ANGINA INDEX: 0
STRESS BASELINE HR: 59 BPM
STRESS DUKE TREADMILL SCORE: 5
STRESS PEAK HR: 137 BPM
STRESS POST ESTIMATED WORKLOAD: 7 METS
STRESS POST EXERCISE DUR MIN: 5 MIN
STRESS POST EXERCISE DUR SEC: 9 SEC
STRESS ST DEPRESSION: 0 MM
TARGET HR FORMULA: NORMAL
TARGET HR FORMULA: NORMAL
TEST INDICATION: NORMAL
TEST INDICATION: NORMAL
TIME IN EXERCISE PHASE: NORMAL
TIME IN EXERCISE PHASE: NORMAL

## 2021-10-28 PROCEDURE — 93351 STRESS TTE COMPLETE: CPT | Performed by: INTERNAL MEDICINE

## 2021-10-28 PROCEDURE — 93350 STRESS TTE ONLY: CPT

## 2021-11-12 ENCOUNTER — IMMUNIZATIONS (OUTPATIENT)
Dept: FAMILY MEDICINE CLINIC | Facility: HOSPITAL | Age: 71
End: 2021-11-12

## 2021-11-12 DIAGNOSIS — Z23 ENCOUNTER FOR IMMUNIZATION: Primary | ICD-10-CM

## 2021-11-12 PROCEDURE — 0013A COVID-19 MODERNA VACC 0.25 ML BOOSTER: CPT

## 2021-11-12 PROCEDURE — 91306 COVID-19 MODERNA VACC 0.25 ML BOOSTER: CPT

## 2021-12-15 ENCOUNTER — TELEPHONE (OUTPATIENT)
Dept: FAMILY MEDICINE CLINIC | Facility: CLINIC | Age: 71
End: 2021-12-15

## 2021-12-18 ENCOUNTER — OFFICE VISIT (OUTPATIENT)
Dept: URGENT CARE | Facility: CLINIC | Age: 71
End: 2021-12-18
Payer: COMMERCIAL

## 2021-12-18 VITALS
HEIGHT: 73 IN | BODY MASS INDEX: 33.13 KG/M2 | WEIGHT: 250 LBS | TEMPERATURE: 98.2 F | HEART RATE: 70 BPM | RESPIRATION RATE: 18 BRPM | OXYGEN SATURATION: 95 %

## 2021-12-18 DIAGNOSIS — J06.9 VIRAL URI: Primary | ICD-10-CM

## 2021-12-18 PROCEDURE — G0383 LEV 4 HOSP TYPE B ED VISIT: HCPCS | Performed by: PHYSICIAN ASSISTANT

## 2022-04-05 DIAGNOSIS — E78.2 HYPERLIPEMIA, MIXED: Primary | ICD-10-CM

## 2022-04-05 DIAGNOSIS — R73.01 ELEVATED FASTING BLOOD SUGAR: ICD-10-CM

## 2022-04-05 DIAGNOSIS — Z12.5 ENCOUNTER FOR PROSTATE CANCER SCREENING: ICD-10-CM

## 2022-04-07 LAB
ALBUMIN SERPL-MCNC: 3.9 G/DL (ref 3.6–5.1)
ALBUMIN/GLOB SERPL: 1.6 (CALC) (ref 1–2.5)
ALP SERPL-CCNC: 61 U/L (ref 35–144)
ALT SERPL-CCNC: 11 U/L (ref 9–46)
AST SERPL-CCNC: 16 U/L (ref 10–35)
BILIRUB SERPL-MCNC: 0.7 MG/DL (ref 0.2–1.2)
BUN SERPL-MCNC: 23 MG/DL (ref 7–25)
BUN/CREAT SERPL: NORMAL (CALC) (ref 6–22)
CALCIUM SERPL-MCNC: 9.2 MG/DL (ref 8.6–10.3)
CHLORIDE SERPL-SCNC: 107 MMOL/L (ref 98–110)
CHOLEST SERPL-MCNC: 193 MG/DL
CHOLEST/HDLC SERPL: 3.7 (CALC)
CO2 SERPL-SCNC: 29 MMOL/L (ref 20–32)
CREAT SERPL-MCNC: 0.8 MG/DL (ref 0.7–1.18)
EST. AVERAGE GLUCOSE BLD GHB EST-MCNC: 100 MG/DL
EST. AVERAGE GLUCOSE BLD GHB EST-SCNC: 5.5 MMOL/L
GLOBULIN SER CALC-MCNC: 2.5 G/DL (CALC) (ref 1.9–3.7)
GLUCOSE SERPL-MCNC: 85 MG/DL (ref 65–99)
HBA1C MFR BLD: 5.1 % OF TOTAL HGB
HDLC SERPL-MCNC: 52 MG/DL
LDLC SERPL CALC-MCNC: 123 MG/DL (CALC)
NONHDLC SERPL-MCNC: 141 MG/DL (CALC)
POTASSIUM SERPL-SCNC: 4.4 MMOL/L (ref 3.5–5.3)
PROT SERPL-MCNC: 6.4 G/DL (ref 6.1–8.1)
PSA SERPL-MCNC: 0.8 NG/ML
SL AMB EGFR AFRICAN AMERICAN: 103 ML/MIN/1.73M2
SL AMB EGFR NON AFRICAN AMERICAN: 89 ML/MIN/1.73M2
SODIUM SERPL-SCNC: 140 MMOL/L (ref 135–146)
TRIGL SERPL-MCNC: 81 MG/DL

## 2022-04-12 ENCOUNTER — OFFICE VISIT (OUTPATIENT)
Dept: FAMILY MEDICINE CLINIC | Facility: CLINIC | Age: 72
End: 2022-04-12
Payer: COMMERCIAL

## 2022-04-12 VITALS
SYSTOLIC BLOOD PRESSURE: 124 MMHG | WEIGHT: 255 LBS | BODY MASS INDEX: 33.8 KG/M2 | TEMPERATURE: 100.5 F | HEIGHT: 73 IN | HEART RATE: 97 BPM | OXYGEN SATURATION: 95 % | DIASTOLIC BLOOD PRESSURE: 72 MMHG

## 2022-04-12 DIAGNOSIS — R06.00 DYSPNEA ON EXERTION: ICD-10-CM

## 2022-04-12 DIAGNOSIS — Z00.00 ANNUAL PHYSICAL EXAM: Primary | ICD-10-CM

## 2022-04-12 DIAGNOSIS — E78.2 HYPERLIPEMIA, MIXED: ICD-10-CM

## 2022-04-12 PROCEDURE — 1036F TOBACCO NON-USER: CPT | Performed by: FAMILY MEDICINE

## 2022-04-12 PROCEDURE — 99397 PER PM REEVAL EST PAT 65+ YR: CPT | Performed by: FAMILY MEDICINE

## 2022-04-12 PROCEDURE — 1160F RVW MEDS BY RX/DR IN RCRD: CPT | Performed by: FAMILY MEDICINE

## 2022-04-12 NOTE — PATIENT INSTRUCTIONS
Read consult Dr [facility]  To further assess cardiac status  Wellness Visit for Adults   AMBULATORY CARE:   A wellness visit  is when you see your healthcare provider to get screened for health problems  Your healthcare provider will also give you advice on how to stay healthy  Write down your questions so you remember to ask them  Ask your healthcare provider how often you should have a wellness visit  What happens at a wellness visit:  Your healthcare provider will ask about your health, and your family history of health problems  This includes high blood pressure, heart disease, and cancer  He or she will ask if you have symptoms that concern you, if you smoke, and about your mood  You may also be asked about your intake of medicines, supplements, food, and alcohol  Any of the following may be done:  · Your weight  will be checked  Your height may also be checked so your body mass index (BMI) can be calculated  Your BMI shows if you are at a healthy weight  · Your blood pressure  and heart rate will be checked  Your temperature may also be checked  · Blood and urine tests  may be done  Blood tests may be done to check your cholesterol levels  Abnormal cholesterol levels increase your risk for heart disease and stroke  You may also need a blood or urine test to check for diabetes if you are at increased risk  Urine tests may be done to look for signs of an infection or kidney disease  · A physical exam  includes checking your heartbeat and lungs with a stethoscope  Your healthcare provider may also check your skin to look for sun damage  · Screening tests  may be recommended  A screening test is done to check for diseases that may not cause symptoms  The screening tests you may need depend on your age, gender, family history, and lifestyle habits  For example, colorectal screening may be recommended if you are 48years old or older      Screening tests you need if you are a woman:   · A Pap smear  is used to screen for cervical cancer  Pap smears are usually done every 3 to 5 years depending on your age  You may need them more often if you have had abnormal Pap smear test results in the past  Ask your healthcare provider how often you should have a Pap smear  · A mammogram  is an x-ray of your breasts to screen for breast cancer  Experts recommend mammograms every 2 years starting at age 48 years  You may need a mammogram at age 52 years or younger if you have an increased risk for breast cancer  Talk to your healthcare provider about when you should start having mammograms and how often you need them  Vaccines you may need:   · Get an influenza vaccine  every year  The influenza vaccine protects you from the flu  Several types of viruses cause the flu  The viruses change over time, so new vaccines are made each year  · Get a tetanus-diphtheria (Td) booster vaccine  every 10 years  This vaccine protects you against tetanus and diphtheria  Tetanus is a severe infection that may cause painful muscle spasms and lockjaw  Diphtheria is a severe bacterial infection that causes a thick covering in the back of your mouth and throat  · Get a human papillomavirus (HPV) vaccine  if you are female and aged 23 to 32 or male 23 to 24 and never received it  This vaccine protects you from HPV infection  HPV is the most common infection spread by sexual contact  HPV may also cause vaginal, penile, and anal cancers  · Get a pneumococcal vaccine  if you are aged 72 years or older  The pneumococcal vaccine is an injection given to protect you from pneumococcal disease  Pneumococcal disease is an infection caused by pneumococcal bacteria  The infection may cause pneumonia, meningitis, or an ear infection  · Get a shingles vaccine  if you are 60 or older, even if you have had shingles before  The shingles vaccine is an injection to protect you from the varicella-zoster virus   This is the same virus that causes chickenpox  Shingles is a painful rash that develops in people who had chickenpox or have been exposed to the virus  How to eat healthy:  My Plate is a model for planning healthy meals  It shows the types and amounts of foods that should go on your plate  Fruits and vegetables make up about half of your plate, and grains and protein make up the other half  A serving of dairy is included on the side of your plate  The amount of calories and serving sizes you need depends on your age, gender, weight, and height  Examples of healthy foods are listed below:  · Eat a variety of vegetables  such as dark green, red, and orange vegetables  You can also include canned vegetables low in sodium (salt) and frozen vegetables without added butter or sauces  · Eat a variety of fresh fruits , canned fruit in 100% juice, frozen fruit, and dried fruit  · Include whole grains  At least half of the grains you eat should be whole grains  Examples include whole-wheat bread, wheat pasta, brown rice, and whole-grain cereals such as oatmeal     · Eat a variety of protein foods such as seafood (fish and shellfish), lean meat, and poultry without skin (turkey and chicken)  Examples of lean meats include pork leg, shoulder, or tenderloin, and beef round, sirloin, tenderloin, and extra lean ground beef  Other protein foods include eggs and egg substitutes, beans, peas, soy products, nuts, and seeds  · Choose low-fat dairy products such as skim or 1% milk or low-fat yogurt, cheese, and cottage cheese  · Limit unhealthy fats  such as butter, hard margarine, and shortening  Exercise:  Exercise at least 30 minutes per day on most days of the week  Some examples of exercise include walking, biking, dancing, and swimming  You can also fit in more physical activity by taking the stairs instead of the elevator or parking farther away from stores  Include muscle strengthening activities 2 days each week   Regular exercise provides many health benefits  It helps you manage your weight, and decreases your risk for type 2 diabetes, heart disease, stroke, and high blood pressure  Exercise can also help improve your mood  Ask your healthcare provider about the best exercise plan for you  General health and safety guidelines:   · Do not smoke  Nicotine and other chemicals in cigarettes and cigars can cause lung damage  Ask your healthcare provider for information if you currently smoke and need help to quit  E-cigarettes or smokeless tobacco still contain nicotine  Talk to your healthcare provider before you use these products  · Limit alcohol  A drink of alcohol is 12 ounces of beer, 5 ounces of wine, or 1½ ounces of liquor  · Lose weight, if needed  Being overweight increases your risk of certain health conditions  These include heart disease, high blood pressure, type 2 diabetes, and certain types of cancer  · Protect your skin  Do not sunbathe or use tanning beds  Use sunscreen with a SPF 15 or higher  Apply sunscreen at least 15 minutes before you go outside  Reapply sunscreen every 2 hours  Wear protective clothing, hats, and sunglasses when you are outside  · Drive safely  Always wear your seatbelt  Make sure everyone in your car wears a seatbelt  A seatbelt can save your life if you are in an accident  Do not use your cell phone when you are driving  This could distract you and cause an accident  Pull over if you need to make a call or send a text message  · Practice safe sex  Use latex condoms if are sexually active and have more than one partner  Your healthcare provider may recommend screening tests for sexually transmitted infections (STIs)  · Wear helmets, lifejackets, and protective gear  Always wear a helmet when you ride a bike or motorcycle, go skiing, or play sports that could cause a head injury  Wear protective equipment when you play sports   Wear a lifejacket when you are on a boat or doing water sports  © Copyright [employer] 2022 Information is for End User's use only and may not be sold, redistributed or otherwise used for commercial purposes  All illustrations and images included in CareNotes® are the copyrighted property of [employer]  or [employer] Cherry  The above information is an  only  It is not intended as medical advice for individual conditions or treatments  Talk to your doctor, nurse or pharmacist before following any medical regimen to see if it is safe and effective for you  Cholesterol and Your Health   AMBULATORY CARE:   Cholesterol  is a waxy, fat-like substance  Your body uses cholesterol to make hormones and new cells, and to protect nerves  Cholesterol is made by your body  It also comes from certain foods you eat, such as meat and dairy products  Your healthcare provider can help you set goals for your cholesterol levels  He or she can help you create a plan to meet your goals  Cholesterol level goals: Your cholesterol level goals depend on your risk for heart disease, your age, and your other health conditions  The following are general guidelines:  · Total cholesterol  includes low-density lipoprotein (LDL), high-density lipoprotein (HDL), and triglyceride levels  The total cholesterol level should be lower than 200 mg/dL and is best at about 150 mg/dL  · LDL cholesterol  is called bad cholesterol  because it forms plaque in your arteries  As plaque builds up, your arteries become narrow, and less blood flows through  When plaque decreases blood flow to your heart, you may have chest pain  If plaque completely blocks an artery that brings blood to your heart, you may have a heart attack  Plaque can break off and form blood clots  Blood clots may block arteries in your brain and cause a stroke  The level should be less than 130 mg/dL and is best at about 100 mg/dL           · HDL cholesterol  is called good cholesterol  because it helps remove LDL cholesterol from your arteries  It does this by attaching to LDL cholesterol and carrying it to your liver  Your liver breaks down LDL cholesterol so your body can get rid of it  High levels of HDL cholesterol can help prevent a heart attack and stroke  Low levels of HDL cholesterol can increase your risk for heart disease, heart attack, and stroke  The level should be 60 mg/dL or higher  · Triglycerides  are a type of fat that store energy from foods you eat  High levels of triglycerides also cause plaque buildup  This can increase your risk for a heart attack or stroke  If your triglyceride level is high, your LDL cholesterol level may also be high  The level should be less than 150 mg/dL  Any of the following can increase your risk for high cholesterol:   · Smoking cigarettes    · Being overweight or obese, or not getting enough exercise    · Drinking large amounts of alcohol    · A medical condition such as hypertension (high blood pressure) or diabetes    · Certain genes passed from your parents to you    · Age older than 65 years    What you need to know about having your cholesterol levels checked: Adults 21to 39years of age should have their cholesterol levels checked every 4 to 6 years  Adults 45 years or older should have their cholesterol checked every 1 to 2 years  You may need your cholesterol checked more often, or at a younger age, if you have risk factors for heart disease  You may also need to have your cholesterol checked more often if you have other health conditions, such as diabetes  Blood tests are used to check cholesterol levels  Blood tests measure your levels of triglycerides, LDL cholesterol, and HDL cholesterol  How healthy fats affect your cholesterol levels:  Healthy fats, also called unsaturated fats, help lower LDL cholesterol and triglyceride levels   Healthy fats include the following:  · Monounsaturated fats  are found in foods such as olive oil, canola oil, avocado, nuts, and olives  · Polyunsaturated fats,  such as omega 3 fats, are found in fish, such as salmon, trout, and tuna  They can also be found in plant foods such as flaxseed, walnuts, and soybeans  How unhealthy fats affect your cholesterol levels:  Unhealthy fats increase LDL cholesterol and triglyceride levels  They are found in foods high in cholesterol, saturated fat, and trans fat:  · Cholesterol  is found in eggs, dairy, and meat  · Saturated fat  is found in butter, cheese, ice cream, whole milk, and coconut oil  Saturated fat is also found in meat, such as sausage, hot dogs, and bologna  · Trans fat  is found in liquid oils and is used in fried and baked foods  Foods that contain trans fats include chips, crackers, muffins, sweet rolls, microwave popcorn, and cookies  Treatment  for high cholesterol will also decrease your risk of heart disease, heart attack, and stroke  Treatment may include any of the following:  · Lifestyle changes  may include food, exercise, weight loss, and quitting smoking  You may also need to decrease the amount of alcohol you drink  Your healthcare provider will want you to start with lifestyle changes  Other treatment may be added if lifestyle changes are not enough  Your healthcare provider may recommend you work with a team to manage hyperlipidemia  The team may include medical experts such as a dietitian, an exercise or physical therapist, and a behavior therapist  Your family members may be included in helping you create lifestyle changes  · Medicines  may be given to lower your LDL cholesterol, triglyceride levels, or total cholesterol level  You may need medicines to lower your cholesterol if any of the following is true:    ? You have a history of stroke, TIA, unstable angina, or a heart attack  ? Your LDL cholesterol level is 190 mg/dL or higher      ? You are age 36 to 76 years, have diabetes or heart disease risk factors, and your LDL cholesterol is 70 mg/dL or higher  · Supplements  include fish oil, red yeast rice, and garlic  Fish oil may help lower your triglyceride and LDL cholesterol levels  It may also increase your HDL cholesterol level  Red yeast rice may help decrease your total cholesterol level and LDL cholesterol level  Garlic may help lower your total cholesterol level  Do not take any supplements without talking to your healthcare provider  Food changes you can make to lower your cholesterol levels:  A dietitian can help you create a healthy eating plan  He or she can show you how to read food labels and choose foods low in saturated fat, trans fats, and cholesterol  · Decrease the total amount of fat you eat  Choose lean meats, fat-free or 1% fat milk, and low-fat dairy products, such as yogurt and cheese  Try to limit or avoid red meats  Limit or do not eat fried foods or baked goods, such as cookies  · Replace unhealthy fats with healthy fats  Cook foods in olive oil or canola oil  Choose soft margarines that are low in saturated fat and trans fat  Seeds, nuts, and avocados are other examples of healthy fats  · Eat foods with omega-3 fats  Examples include salmon, tuna, mackerel, walnuts, and flaxseed  Eat fish 2 times per week  Pregnant women should not eat fish that have high levels of mercury, such as shark, swordfish, and matthew mackerel  · Increase the amount of high-fiber foods you eat  High-fiber foods can help lower your LDL cholesterol  Aim to get between 20 and 30 grams of fiber each day  Fruits and vegetables are high in fiber  Eat at least 5 servings each day  Other high-fiber foods are whole-grain or whole-wheat breads, pastas, or cereals, and brown rice  Eat 3 ounces of whole-grain foods each day  Increase fiber slowly  You may have abdominal discomfort, bloating, and gas if you add fiber to your diet too quickly  · Eat healthy protein foods    Examples include low-fat dairy products, skinless chicken and turkey, fish, and nuts  · Limit foods and drinks that are high in sugar  Your dietitian or healthcare provider can help you create daily limits for high-sugar foods and drinks  The limit may be lower if you have diabetes or another health condition  Limits can also help you lose weight if needed  Lifestyle changes you can make to lower your cholesterol levels:   · Maintain a healthy weight  Ask your healthcare provider what a healthy weight is for you  Ask him or her to help you create a weight loss plan if needed  Weight loss can decrease your total cholesterol and triglyceride levels  Weight loss may also help keep your blood pressure at a healthy level  · Be physically active throughout the day  Physical activity, such as exercise, can help lower your total cholesterol level and maintain a healthy weight  Physical activity can also help increase your HDL cholesterol level  Work with your healthcare provider to create an program that is right for you  Get at least 30 to 40 minutes of moderate physical activity most days of the week  Examples of exercise include brisk walking, swimming, or biking  Also include strength training at least 2 times each week  Your healthcare providers can help you create a physical activity plan  · Do not smoke  Nicotine and other chemicals in cigarettes and cigars can raise your cholesterol levels  Ask your healthcare provider for information if you currently smoke and need help to quit  E-cigarettes or smokeless tobacco still contain nicotine  Talk to your healthcare provider before you use these products  · Limit or do not drink alcohol  Alcohol can increase your triglyceride levels  Ask your healthcare provider before you drink alcohol  Ask how much is okay for you to drink in 24 hours or 1 week  Follow up with your doctor as directed:  Write down your questions so you remember to ask them during your visits    © Copyright [address] 2022 Information is for End User's use only and may not be sold, redistributed or otherwise used for commercial purposes  All illustrations and images included in CareNotes® are the copyrighted property of [employer]  or [employer] Cherry  The above information is an  only  It is not intended as medical advice for individual conditions or treatments  Talk to your doctor, nurse or pharmacist before following any medical regimen to see if it is safe and effective for you

## 2022-04-12 NOTE — PROGRESS NOTES
ADULT ANNUAL PHYSICAL  [address]    NAME: Ej Clay  AGE: 67 y o  SEX: male  : 1950     DATE: 2022     Assessment and Plan:     Problem List Items Addressed This Visit        Other    Hyperlipemia, mixed    Relevant Orders    Ambulatory Referral to Cardiology      Other Visit Diagnoses     Annual physical exam    -  Primary    BMI 33 0-33 9,adult        Dyspnea on exertion        Relevant Orders    Ambulatory Referral to Cardiology          Immunizations and preventive care screenings were discussed with patient today  Appropriate education was printed on patient's after visit summary  Counseling:  Alcohol/drug use: discussed moderation in alcohol intake, the recommendations for healthy alcohol use, and avoidance of illicit drug use  Dental Health: discussed importance of regular tooth brushing, flossing, and dental visits  Injury prevention: discussed safety/seat belts, safety helmets, smoke detectors, carbon dioxide detectors, and smoking near bedding or upholstery  · Exercise: the importance of regular exercise/physical activity was discussed  Recommend exercise 3-5 times per week for at least 30 minutes  BMI Counseling: Body mass index is 33 64 kg/m²  The BMI is above normal  Nutrition recommendations include decreasing portion sizes and moderation in carbohydrate intake  Exercise recommendations include moderate physical activity 150 minutes/week  No pharmacotherapy was ordered  Rationale for BMI follow-up plan is due to patient being overweight or obese  Return in about 1 year (around 2023)  Chief Complaint:     No chief complaint on file  History of Present Illness:     Adult Annual Physical   Patient here for a comprehensive physical exam  The patient reports some concerns about cardiac status  Still has some mild shortness of breath  Had stress test last year    Was advised to have a calcium score which he had not scheduled  He is questioning whether further cardiac evaluation would be available  Labs are reviewed  Still borderline hyperlipidemia  Patient does not wish to go on a statin peer  Diet and Physical Activity  · Diet/Nutrition: well balanced diet, limited junk food and consuming 3-5 servings of fruits/vegetables daily  · Exercise: moderate cardiovascular exercise and 3-4 times a week on average  Depression Screening  PHQ-2/9 Depression Screening         General Health  · Sleep: sleeps well and gets 7-8 hours of sleep on average  · Hearing: normal - bilateral   · Vision: wears glasses  · Dental: regular dental visits and brushes teeth once daily   Health  · Symptoms include: nocturia     Review of Systems:     Review of Systems   Constitutional: Negative for activity change, appetite change, diaphoresis and fatigue  Respiratory: Positive for shortness of breath  Negative for cough, chest tightness and wheezing  Cardiovascular: Negative for chest pain, palpitations and leg swelling  Fast or slow heart rate   Gastrointestinal: Negative for abdominal pain, blood in stool, constipation, diarrhea, nausea and vomiting  Genitourinary: Negative for difficulty urinating, dysuria, frequency and hematuria  Musculoskeletal: Negative for arthralgias, gait problem, joint swelling and myalgias  Neurological: Negative for dizziness, light-headedness and headaches  Psychiatric/Behavioral: Negative for agitation, confusion, dysphoric mood and sleep disturbance  The patient is not nervous/anxious  Past Medical History:     Past Medical History:   Diagnosis Date    Back pain       Past Surgical History:     History reviewed  No pertinent surgical history     Family History:     Family History   Problem Relation Age of Onset    Colon cancer Neg Hx     Colon polyps Neg Hx       Social History:     Social History     Socioeconomic History    Marital status: Single     Spouse name: None    Number of children: None    Years of education: None    Highest education level: None   Occupational History    None   Tobacco Use    Smoking status: Never Smoker    Smokeless tobacco: Never Used   Vaping Use    Vaping Use: Never used   Substance and Sexual Activity    Alcohol use: No     Comment: once a year    Drug use: No    Sexual activity: None   Other Topics Concern    None   Social History Narrative    None     Social Determinants of Health     Financial Resource Strain: Not on file   Food Insecurity: Not on file   Transportation Needs: Not on file   Physical Activity: Not on file   Stress: Not on file   Social Connections: Not on file   Intimate Partner Violence: Not on file   Housing Stability: Not on file      Current Medications:     No current outpatient medications on file  No current facility-administered medications for this visit  Allergies:     No Known Allergies   Physical Exam:     /72   Pulse 97   Temp 100 5 °F (38 1 °C) (Tympanic)   Ht 6' 1" (1 854 m)   Wt 116 kg (255 lb)   SpO2 95%   BMI 33 64 kg/m²     Physical Exam  Vitals and nursing note reviewed  Constitutional:       General: He is not in acute distress  Appearance: He is well-developed  He is obese  He is not ill-appearing  HENT:      Head: Normocephalic and atraumatic  Right Ear: Tympanic membrane normal       Left Ear: Tympanic membrane normal    Eyes:      Conjunctiva/sclera: Conjunctivae normal    Cardiovascular:      Rate and Rhythm: Normal rate and regular rhythm  Heart sounds: No murmur heard  Pulmonary:      Effort: Pulmonary effort is normal  No respiratory distress  Breath sounds: Normal breath sounds  Abdominal:      Palpations: Abdomen is soft  There is no mass  Tenderness: There is no abdominal tenderness  There is no guarding  Hernia: No hernia is present  Musculoskeletal:      Cervical back: Neck supple        Right lower leg: No edema  Left lower leg: No edema  Skin:     General: Skin is warm and dry  Neurological:      Mental Status: He is alert and oriented to person, place, and time     Psychiatric:         Mood and Affect: Mood normal          Behavior: Behavior normal           Kobe Kaur MD  [address]

## 2022-04-13 ENCOUNTER — TELEPHONE (OUTPATIENT)
Dept: FAMILY MEDICINE CLINIC | Facility: CLINIC | Age: 72
End: 2022-04-13

## 2022-04-13 ENCOUNTER — HOSPITAL ENCOUNTER (EMERGENCY)
Facility: HOSPITAL | Age: 72
Discharge: HOME | End: 2022-04-13
Attending: EMERGENCY MEDICINE
Payer: COMMERCIAL

## 2022-04-13 VITALS
DIASTOLIC BLOOD PRESSURE: 60 MMHG | SYSTOLIC BLOOD PRESSURE: 130 MMHG | WEIGHT: 255 LBS | BODY MASS INDEX: 33.64 KG/M2 | RESPIRATION RATE: 18 BRPM | HEART RATE: 86 BPM | OXYGEN SATURATION: 95 % | TEMPERATURE: 98.7 F

## 2022-04-13 DIAGNOSIS — Z20.822 ENCOUNTER FOR LABORATORY TESTING FOR COVID-19 VIRUS: Primary | ICD-10-CM

## 2022-04-13 LAB
FLUAV RNA RESP QL NAA+PROBE: NEGATIVE
FLUBV RNA RESP QL NAA+PROBE: NEGATIVE
RSV RNA RESP QL NAA+PROBE: NEGATIVE
SARS-COV-2 RNA RESP QL NAA+PROBE: NEGATIVE

## 2022-04-13 PROCEDURE — 99284 EMERGENCY DEPT VISIT MOD MDM: CPT | Performed by: EMERGENCY MEDICINE

## 2022-04-13 PROCEDURE — 99283 EMERGENCY DEPT VISIT LOW MDM: CPT

## 2022-04-13 PROCEDURE — 0241U HB NFCT DS VIR RESP RNA 4 TRGT: CPT | Performed by: EMERGENCY MEDICINE

## 2022-04-13 NOTE — TELEPHONE ENCOUNTER
Covid-like Sx:-    Pt called in to report that he felt very sick yesterday after his OV w/ PM on 4/12/2022  Sx: Chills, HA, Fatigue, Fever (101 degrees F), Dry Mouth, feels like "hit by a [place] truck "    [facility]//22[facility] other viruses swab  Results Negative  Pt stated that hospital expected the test to be negative b/c "it is too early to tell "    Current fever 100 5 degrees F at 12:05 PM       Pt requests advice -- "Can PM prescribe something, should I do more Covid testing, or have another OV? Please review and advise

## 2022-04-13 NOTE — ED PROVIDER NOTES
HPI: Patient is a 67 y o  male who presents with 1 days of fever which the patient describes at mild The patient has not had contact with people with similar symptoms  The patient taken OTC medication with relief of symptoms  Took 2 aspirin last night and no longer has a fever  Also with fatigue that is also improved since last night  No Known Allergies    Past Medical History:   Diagnosis Date    Back pain       No past surgical history on file  Social History     Tobacco Use    Smoking status: Never Smoker    Smokeless tobacco: Never Used   Vaping Use    Vaping Use: Never used   Substance Use Topics    Alcohol use: No     Comment: once a year    Drug use: No       Nursing notes reviewed  Physical Exam:  ED Triage Vitals [04/13/22 0801]   Temperature Pulse Respirations Blood Pressure SpO2   98 7 °F (37 1 °C) 86 18 130/60 95 %      Temp src Heart Rate Source Patient Position - Orthostatic VS BP Location FiO2 (%)   -- Monitor -- -- --      Pain Score       No Pain           ROS: Positive for fever, the remainder of a 10 organ system ROS was otherwise unremarkable  General: awake, alert, no acute distress    Head: normocephalic, atraumatic    Eyes: no scleral icterus  Ears: external ears normal, hearing grossly intact  Nose: external exam grossly normal, negative nasal discharge  Neck: symmetric, No JVD noted, trachea midline  Pulmonary: no respiratory distress, no tachypnea noted  Cardiovascular: appears well perfused  Abdomen: no distention noted  Musculoskeletal: no deformities noted, tone normal  Neuro: grossly non-focal  Psych: mood and affect appropriate    The patient is stable and has a history and physical exam consistent with a viral illness  COVID19 testing has been performed  I considered the patient's other medical conditions as applicable/noted above in my medical decision making  The patient is stable upon discharge  The plan is for supportive care at home      The patient (and any family present) verbalized understanding of the discharge instructions and warnings that would necessitate return to the Emergency Department  All questions were answered prior to discharge  Medications - No data to display  Final diagnoses:   Encounter for laboratory testing for COVID-19 virus     Time reflects when diagnosis was documented in both MDM as applicable and the Disposition within this note     Time User Action Codes Description Comment    4/13/2022  8:49 AM Dell Cruz Add [Z20 822] Encounter for laboratory testing for COVID-19 virus       ED Disposition     ED Disposition Condition Date/Time Comment    Discharge Stable Wed Apr 13, 2022  8:49 AM Pagan B Hill discharge to home/self care  Follow-up Information     Follow up With Specialties Details Why Contact Info Additional [address], [place] [facility]   [address]  [address]  [facility] [phone]        [address] Emergency Department Emergency Medicine  If symptoms worsen [address] [place] [address]  [phone] [address] Emergency Department, [address] [facility] O[address] Lu[address]        There are no discharge medications for this patient  No discharge procedures on file      Electronically Signed by       German Mauricio DO  04/13/22 1140

## 2022-04-13 NOTE — TELEPHONE ENCOUNTER
Pt is aware of advice & is scheduled for a VV (Phone call only, as per pt request) on Thursday, 4/14/22

## 2022-04-13 NOTE — ED NOTES
Pt walked bacl to room5 by charge Rn Pt stated " why am I going to a room, I don't need an act of congress to be seen "     Blaine Luz, CHOLO  04/13/22 9407

## 2022-04-13 NOTE — ED NOTES
Ask pt to take jacket off for vitals  Pt said "stop barking orders at me, Im here for COVID test that all"   Explained to pt that we need to triage him and he states " I don't need to be triaged, I need a COVID test     Pt did not want to answer triage questions, pt states "we dont need to know all that for a COVID test "     Kj Dave, RN  04/13/22 9592

## 2022-04-14 ENCOUNTER — TELEMEDICINE (OUTPATIENT)
Dept: FAMILY MEDICINE CLINIC | Facility: CLINIC | Age: 72
End: 2022-04-14
Payer: MEDICARE

## 2022-04-14 VITALS — WEIGHT: 255 LBS | HEIGHT: 73 IN | BODY MASS INDEX: 33.8 KG/M2

## 2022-04-14 DIAGNOSIS — Z20.822 SUSPECTED COVID-19 VIRUS INFECTION: Primary | ICD-10-CM

## 2022-04-14 DIAGNOSIS — R53.83 FATIGUE, UNSPECIFIED TYPE: ICD-10-CM

## 2022-04-14 PROCEDURE — 99213 OFFICE O/P EST LOW 20 MIN: CPT | Performed by: FAMILY MEDICINE

## 2022-04-14 PROCEDURE — 3008F BODY MASS INDEX DOCD: CPT | Performed by: FAMILY MEDICINE

## 2022-04-14 NOTE — PROGRESS NOTES
COVID-19 Outpatient Progress Note    Assessment/Plan:    Problem List Items Addressed This Visit     None      Visit Diagnoses     Suspected COVID-19 virus infection    -  Primary    Relevant Orders    Covid/Flu- Office Collect    Fatigue, unspecified type        Relevant Orders    TSH, 3rd generation with Free T4 reflex    CBC and differential         Disposition:     Recommended patient to come to the office to test for COVID-19  Patient is fully vaccinated and I recommended self quarantine for 5 days followed by strict mask use for an additional 5 days  If patient were to develop symptoms, they should immediately self isolate and call our office for further guidance  I have spent 15 minutes directly with the patient  Greater than 50% of this time was spent in counseling/coordination of care regarding: instructions for management and impressions  Patient is fully vaccinated, will wait another 24 hours to have him come in be retested  Symptomatic treatment until then  He is aware of need for quarantine in and will discuss return to work based on testing results  Patient also report 1 very dark stool  Also has ongoing fatigue prior to this illness  Will have patient go when well for CBC and TSH  Encounter provider Pineda Muse MD    Provider located at [address] [place] [id number]    Recent Visits  Date Type Provider Dept   04/13/22 Telephone [address] [facility]   04/12/22 Office Visit Pineda Muse MD [place] [phone] [facility] recent visits within past 7 days and meeting all other requirements  Today's Visits  Date Type Provider Dept   04/14/22 Telemedicine Pineda Muse MD [facility] [address] [facility] today's visits and meeting all other requirements  Future Appointments  No visits were found meeting these conditions    Showing future appointments within next 150 days and meeting all other requirements   Conducted via [facility]    Patient agrees to participate in a virtual check in via telephone or video visit instead of presenting to the office to address urgent/immediate medical needs  Patient is aware this is a billable service  After connecting through [facility], the patient was identified by name and date of birth  Albina Sanders was informed that this was a telemedicine visit and that the exam was being conducted confidentially over secure lines  My office door was closed  No one else was in the room  Albina Sanders acknowledged consent and understanding of privacy and security of the telemedicine visit  I informed the patient that I have reviewed his record in Epic and presented the opportunity for him to ask any questions regarding the visit today  The patient agreed to participate  Verification of patient location:  Patient is located in the following state in which I hold an active license: [place]    Subjective: Albina Sanders is a 67 y o  male who is concerned about COVID-19  Patient's symptoms include fever, chills, fatigue, malaise, nasal congestion, cough and myalgias  Patient denies rhinorrhea       - Date of symptom onset: 4/12/2022      COVID-19 vaccination status: Fully vaccinated with booster    Exposure:   Contact with a person who is under investigation (PUI) for or who is positive for COVID-19 within the last 14 days?: No    Hospitalized recently for fever and/or lower respiratory symptoms?: No      Currently a healthcare worker that is involved in direct patient care?: No      Works in a special setting where the risk of COVID-19 transmission may be high? (this may include long-term care, correctional and skilled nursing facilities; homeless shelters; assisted-living facilities and group homes ): No      Resident in a special setting where the risk of COVID-19 transmission may be high? (this may include long-term care, correctional and skilled nursing facilities; homeless shelters; assisted-living facilities and group homes ): No      Lab Results   Component Value Date    SARSCOV2 Negative 04/13/2022     Past Medical History:   Diagnosis Date    Back pain      No past surgical history on file  No current outpatient medications on file  No current facility-administered medications for this visit  No Known Allergies    Review of Systems   Constitutional: Positive for chills, fatigue and fever  HENT: Positive for congestion  Negative for rhinorrhea  Respiratory: Positive for cough  Musculoskeletal: Positive for myalgias  Objective:    Vitals:    04/14/22 1513   Weight: 116 kg (255 lb)   Height: 6' 1" (1 854 m)       Physical Exam  Constitutional:       General: He is not in acute distress  Appearance: Normal appearance  He is ill-appearing  HENT:      Head: Normocephalic and atraumatic  Nose: Congestion present  Pulmonary:      Effort: Pulmonary effort is normal  No respiratory distress  Neurological:      Mental Status: He is alert  Psychiatric:         Behavior: Behavior normal          VIRTUAL VISIT [address] verbally agrees to participate in [employer]  Pt is aware that [employer] could be limited without vital signs or the ability to perform a full hands-on physical [address] understands he or the provider may request at any time to terminate the video visit and request the patient to seek care or treatment in person

## 2022-04-15 PROCEDURE — 87636 SARSCOV2 & INF A&B AMP PRB: CPT | Performed by: FAMILY MEDICINE

## 2022-04-16 LAB
BASOPHILS # BLD AUTO: 40 CELLS/UL (ref 0–200)
BASOPHILS NFR BLD AUTO: 1 %
EOSINOPHIL # BLD AUTO: 112 CELLS/UL (ref 15–500)
EOSINOPHIL NFR BLD AUTO: 2.8 %
ERYTHROCYTE [DISTWIDTH] IN BLOOD BY AUTOMATED COUNT: 11.8 % (ref 11–15)
FLUAV RNA RESP QL NAA+PROBE: NEGATIVE
FLUBV RNA RESP QL NAA+PROBE: NEGATIVE
HCT VFR BLD AUTO: 51.3 % (ref 38.5–50)
HGB BLD-MCNC: 17.1 G/DL (ref 13.2–17.1)
LYMPHOCYTES # BLD AUTO: 1092 CELLS/UL (ref 850–3900)
LYMPHOCYTES NFR BLD AUTO: 27.3 %
MCH RBC QN AUTO: 30.2 PG (ref 27–33)
MCHC RBC AUTO-ENTMCNC: 33.3 G/DL (ref 32–36)
MCV RBC AUTO: 90.6 FL (ref 80–100)
MONOCYTES # BLD AUTO: 592 CELLS/UL (ref 200–950)
MONOCYTES NFR BLD AUTO: 14.8 %
NEUTROPHILS # BLD AUTO: 2164 CELLS/UL (ref 1500–7800)
NEUTROPHILS NFR BLD AUTO: 54.1 %
PLATELET # BLD AUTO: 190 THOUSAND/UL (ref 140–400)
PMV BLD REES-ECKER: 10.3 FL (ref 7.5–12.5)
RBC # BLD AUTO: 5.66 MILLION/UL (ref 4.2–5.8)
SARS-COV-2 RNA RESP QL NAA+PROBE: NEGATIVE
TSH SERPL-ACNC: 1.82 MIU/L (ref 0.4–4.5)
WBC # BLD AUTO: 4 THOUSAND/UL (ref 3.8–10.8)

## 2022-04-18 ENCOUNTER — TELEPHONE (OUTPATIENT)
Dept: FAMILY MEDICINE CLINIC | Facility: CLINIC | Age: 72
End: 2022-04-18

## 2022-04-18 NOTE — TELEPHONE ENCOUNTER
----- Message from Soraida Chavez MD sent at 4/18/2022  9:59 AM EDT -----  Call patient with lab result-labs look okay, no evidence of why fatigue  COVID testing negative

## 2022-04-19 ENCOUNTER — TELEPHONE (OUTPATIENT)
Dept: FAMILY MEDICINE CLINIC | Facility: CLINIC | Age: 72
End: 2022-04-19

## 2022-05-27 ENCOUNTER — OFFICE VISIT (OUTPATIENT)
Dept: CARDIOLOGY CLINIC | Facility: CLINIC | Age: 72
End: 2022-05-27
Payer: COMMERCIAL

## 2022-05-27 VITALS
SYSTOLIC BLOOD PRESSURE: 128 MMHG | HEIGHT: 73 IN | WEIGHT: 260.2 LBS | BODY MASS INDEX: 34.48 KG/M2 | DIASTOLIC BLOOD PRESSURE: 68 MMHG | HEART RATE: 85 BPM

## 2022-05-27 DIAGNOSIS — R06.00 DYSPNEA ON EXERTION: Primary | ICD-10-CM

## 2022-05-27 DIAGNOSIS — E78.2 HYPERLIPEMIA, MIXED: ICD-10-CM

## 2022-05-27 DIAGNOSIS — F51.12 INSUFFICIENT SLEEP SYNDROME: ICD-10-CM

## 2022-05-27 PROCEDURE — 3008F BODY MASS INDEX DOCD: CPT | Performed by: FAMILY MEDICINE

## 2022-05-27 PROCEDURE — 99214 OFFICE O/P EST MOD 30 MIN: CPT | Performed by: INTERNAL MEDICINE

## 2022-05-27 PROCEDURE — 1160F RVW MEDS BY RX/DR IN RCRD: CPT | Performed by: INTERNAL MEDICINE

## 2022-05-27 NOTE — PROGRESS NOTES
Cardiology Follow Up    Ej Clay  1950  [id number]  [address] [address] [phone]    1  Hyperlipemia, mixed  Ambulatory Referral to Cardiology   2  Dyspnea on exertion  Ambulatory Referral to Cardiology       Interval History: Followup for hyperlipidemia dyspnea  Had exercise stress echo that was negative  He has no angina and no palpitations  Medical Problems             Problem List     Elevated fasting blood sugar    Hyperlipemia, mixed              Past Medical History:   Diagnosis Date    Back pain      Social History     Socioeconomic History    Marital status: Single     Spouse name: Not on file    Number of children: Not on file    Years of education: Not on file    Highest education level: Not on file   Occupational History    Not on file   Tobacco Use    Smoking status: Never Smoker    Smokeless tobacco: Never Used   Vaping Use    Vaping Use: Never used   Substance and Sexual Activity    Alcohol use: No     Comment: once a year    Drug use: No    Sexual activity: Not on file   Other Topics Concern    Not on file   Social History Narrative    Not on file     Social Determinants of Health     Financial Resource Strain: Not on file   Food Insecurity: Not on file   Transportation Needs: Not on file   Physical Activity: Not on file   Stress: Not on file   Social Connections: Not on file   Intimate Partner Violence: Not on file   Housing Stability: Not on file      Family History   Problem Relation Age of Onset    Colon cancer Neg Hx     Colon polyps Neg Hx      No past surgical history on file  No current outpatient medications on file    No Known Allergies    Labs:     Chemistry        Component Value Date/Time    K 4 4 04/06/2022 0910     04/06/2022 0910    CO2 29 04/06/2022 0910    BUN 23 04/06/2022 0910    CREATININE 0 80 04/06/2022 0910 CREATININE 1 01 12/08/2020 2009        Component Value Date/Time    CALCIUM 9 2 04/06/2022 0910    ALKPHOS 61 04/06/2022 0910    AST 16 04/06/2022 0910    ALT 11 04/06/2022 0910            No results found for: CHOL  Lab Results   Component Value Date    HDL 52 04/06/2022     Lab Results   Component Value Date    LDLCALC 123 (H) 04/06/2022     Lab Results   Component Value Date    TRIG 81 04/06/2022     No results found for: CHOLHDL    Imaging: No results found  Review of Systems   Constitutional: Positive for malaise/fatigue  HENT: Negative  Eyes: Negative  Cardiovascular: Negative  Respiratory: Positive for shortness of breath  Endocrine: Negative  Hematologic/Lymphatic: Negative  Skin: Negative  Musculoskeletal: Negative  Gastrointestinal: Negative  Genitourinary: Negative  Neurological: Negative  Psychiatric/Behavioral: Negative  Allergic/Immunologic: Negative  Vitals:    05/27/22 1432   BP: 128/68   Pulse: 85           Physical Exam  Vitals reviewed  Constitutional:       Appearance: Normal appearance  HENT:      Head: Normocephalic  Nose: Nose normal       Mouth/Throat:      Mouth: Mucous membranes are moist       Pharynx: Oropharynx is clear  Eyes:      General: No scleral icterus  Conjunctiva/sclera: Conjunctivae normal    Cardiovascular:      Rate and Rhythm: Normal rate and regular rhythm  Heart sounds: No murmur heard  No friction rub  No gallop  Pulmonary:      Effort: Pulmonary effort is normal  No respiratory distress  Breath sounds: Normal breath sounds  No wheezing or rales  Abdominal:      General: Abdomen is flat  Bowel sounds are normal  There is no distension  Palpations: Abdomen is soft  Tenderness: There is no abdominal tenderness  There is no guarding  Musculoskeletal:      Cervical back: Normal range of motion and neck supple  Right lower leg: No edema  Left lower leg: No edema     Skin: General: Skin is warm and dry  Neurological:      General: No focal deficit present  Mental Status: He is alert and oriented to person, place, and time  Psychiatric:         Mood and Affect: Mood normal          Behavior: Behavior normal          Discussion/Summary:    Dyspnea: normal exercise stress echocardiogram  He has significant fatigue  Check sleep study  Lipids improved with dietary changes  The patient was counseled regarding diagnostic results, instructions for management, risk factor reductions, impressions  total time of encounter was 25 minutes and 15 minutes was spent counseling

## 2022-06-09 ENCOUNTER — TELEPHONE (OUTPATIENT)
Dept: SLEEP CENTER | Facility: CLINIC | Age: 72
End: 2022-06-09

## 2022-06-09 NOTE — TELEPHONE ENCOUNTER
----- Message from Aline Mcwilliams MD sent at 6/9/2022  9:44 AM EDT -----  Approved  ----- Message -----  From: Brad Johnson  Sent: 8/8/9639   9:48 AM EDT  To: Sleep Medicine [place] Provider    This HOME  sleep study needs approval      If approved please sign and return to clerical pool  If denied please include reasons why  Also provide alternative testing if warranted  Please sign and return to clerical pool

## 2022-06-23 ENCOUNTER — TELEMEDICINE (OUTPATIENT)
Dept: FAMILY MEDICINE CLINIC | Facility: CLINIC | Age: 72
End: 2022-06-23
Payer: COMMERCIAL

## 2022-06-23 DIAGNOSIS — U07.1 COVID-19 VIRUS INFECTION: Primary | ICD-10-CM

## 2022-06-23 PROCEDURE — 99213 OFFICE O/P EST LOW 20 MIN: CPT | Performed by: FAMILY MEDICINE

## 2022-06-23 NOTE — PROGRESS NOTES
Virtual Regular Visit    Verification of patient location:    Patient is located in the following state in which I hold an active license [place]      Assessment/Plan:    Problem List Items Addressed This Visit    None     Visit Diagnoses     COVID-19 virus infection    -  Primary               Reason for visit is   Chief Complaint   Patient presents with    Virtual Brief Visit    COVID-19     PCR testing positive from [facility] [phone] to discuss medication        Encounter provider Anh Stack MD    Provider located at [address] [place] [address]      Recent Visits  No visits were found meeting these conditions  Showing recent visits within past 7 days and meeting all other requirements  Today's Visits  Date Type Provider Dept   06/23/22 Telemedicine Anh Stack MD  [address] [facility] today's visits and meeting all other requirements  Future Appointments  No visits were found meeting these conditions  Showing future appointments within next 150 days and meeting all other requirements       The patient was identified by name and date of birth  Jing Núñez was informed that this is a telemedicine visit and that the visit is being conducted through [address] [place] [address] Now and patient was informed that this is a secure, HIPAA-compliant platform  He agrees to proceed     My office door was closed  No one else was in the room  He acknowledged consent and understanding of privacy and security of the video platform  The patient has agreed to participate and understands they can discontinue the visit at any time  Patient is aware this is a billable service  Yariel Christian is a 67 y o  male conflicting home covid tests   HPI     Past Medical History:   Diagnosis Date    Back pain        History reviewed  No pertinent surgical history  No current outpatient medications on file       No current facility-administered medications for this visit  No Known Allergies    Review of Systems   Constitutional: Negative for fatigue, fever and unexpected weight change  HENT: Positive for congestion and rhinorrhea  Negative for sinus pain and sore throat  Eyes: Negative for visual disturbance  Respiratory: Negative for shortness of breath and wheezing  Cardiovascular: Negative for chest pain and palpitations  Gastrointestinal: Negative for abdominal pain, nausea and vomiting  Musculoskeletal: Positive for arthralgias  Negative for myalgias  Neurological: Negative for syncope, weakness and numbness  Psychiatric/Behavioral: Negative  Negative for confusion, dysphoric mood and suicidal ideas  Video Exam    There were no vitals filed for this visit  Physical Exam  Pulmonary:      Effort: Pulmonary effort is normal           I spent 15 minutes directly with the patient during this visit    [address] verbally agrees to participate in [employer]  Pt is aware that [employer] could be limited without vital signs or the ability to perform a full hands-on physical [address] understands he or the provider may request at any time to terminate the video visit and request the patient to seek care or treatment in person

## 2022-06-24 ENCOUNTER — TELEPHONE (OUTPATIENT)
Dept: FAMILY MEDICINE CLINIC | Facility: CLINIC | Age: 72
End: 2022-06-24

## 2022-06-24 ENCOUNTER — CLINICAL SUPPORT (OUTPATIENT)
Dept: FAMILY MEDICINE CLINIC | Facility: CLINIC | Age: 72
End: 2022-06-24

## 2022-06-24 DIAGNOSIS — U07.1 COVID-19 VIRUS INFECTION: Primary | ICD-10-CM

## 2022-06-24 PROCEDURE — U0005 INFEC AGEN DETEC AMPLI PROBE: HCPCS | Performed by: FAMILY MEDICINE

## 2022-06-24 PROCEDURE — U0003 INFECTIOUS AGENT DETECTION BY NUCLEIC ACID (DNA OR RNA); SEVERE ACUTE RESPIRATORY SYNDROME CORONAVIRUS 2 (SARS-COV-2) (CORONAVIRUS DISEASE [COVID-19]), AMPLIFIED PROBE TECHNIQUE, MAKING USE OF HIGH THROUGHPUT TECHNOLOGIES AS DESCRIBED BY CMS-2020-01-R: HCPCS | Performed by: FAMILY MEDICINE

## 2022-06-24 NOTE — TELEPHONE ENCOUNTER
patient states pfizer medication is what he asking about, should he take the medication for covid  Sx started Wed evening Thursday morning? Would he benefit from medication? Patient has a positive covid [facility]/20[facility] but unsure if it was accurate  Had VV with Dr Kam Liu on 06/23/2022, swabbed for covid at [place] [address] 06/24/2022  patient is requesting the pros and cons of the medication

## 2022-06-24 NOTE — TELEPHONE ENCOUNTER
Is he positive for Covid? The side effects that can occur include pain/redness/swelling at the site of vaccine  Can also cause flu like symptoms (body aches, fever, fatigue)  If he is positive for covid should wait until out of isolation period and symptoms resolved until getting the vaccine

## 2022-06-24 NOTE — TELEPHONE ENCOUNTER
Patient called in because he wants to talk to the nurse practitioner in regards to the Diana 6027 vaccine  He took his COVID swab today 6/24 in the morning and wants to speak to the nurse practitioner about the Diana 6027 vaccine's side affects and pros and cons because he heard he has a five day window to take it if he's positive for COVID and wants to know if that's true

## 2022-06-25 ENCOUNTER — HOSPITAL ENCOUNTER (EMERGENCY)
Facility: HOSPITAL | Age: 72
Discharge: HOME | End: 2022-06-25
Attending: EMERGENCY MEDICINE | Admitting: EMERGENCY MEDICINE
Payer: COMMERCIAL

## 2022-06-25 VITALS
TEMPERATURE: 97.5 F | RESPIRATION RATE: 18 BRPM | SYSTOLIC BLOOD PRESSURE: 140 MMHG | HEART RATE: 84 BPM | OXYGEN SATURATION: 98 % | DIASTOLIC BLOOD PRESSURE: 75 MMHG

## 2022-06-25 DIAGNOSIS — U07.1 COVID-19: Primary | ICD-10-CM

## 2022-06-25 LAB — SARS-COV-2 RNA RESP QL NAA+PROBE: POSITIVE

## 2022-06-25 PROCEDURE — 99284 EMERGENCY DEPT VISIT MOD MDM: CPT | Performed by: EMERGENCY MEDICINE

## 2022-06-25 PROCEDURE — 99283 EMERGENCY DEPT VISIT LOW MDM: CPT

## 2022-06-25 NOTE — ED NOTES
Pt demanded just see the doctor  Refused to get vital signs taken except for temperature  Pt is irritable and becoming inpatient   [address], CHOLO Crowe RN  06/25/22 1532

## 2022-06-25 NOTE — TELEPHONE ENCOUNTER
Pt called in for Paxlovid  He is currently hospitalized  I informed him the dr's at  have to treat him and he's in their care

## 2022-06-25 NOTE — ED PROVIDER NOTES
HPI: Patient is a 67 y o  male who presents with 4 days of fever and cough which the patient describes at mild The patient has had contact with people with similar symptoms  The patient has not taken any medication  Patient was tested at PCPs office and found to be positive but had a has been unable to get in contact with them regarding paxlovid prescription  Long discussion with patient regarding risks and benefits of medication  Patient does not take any other medication to interact  Patient states he is vaccinated against COVID and boosted  Otherwise well-appearing  Confirmed with [facility] availability of drug  No Known Allergies    Past Medical History:   Diagnosis Date    Back pain       No past surgical history on file  Social History     Tobacco Use    Smoking status: Never Smoker    Smokeless tobacco: Never Used   Vaping Use    Vaping Use: Never used   Substance Use Topics    Alcohol use: No     Comment: once a year    Drug use: No       Nursing notes reviewed  Physical Exam:  ED Triage Vitals   Temperature Pulse Respirations Blood Pressure SpO2   06/25/22 1319 06/25/22 1402 06/25/22 1402 06/25/22 1402 06/25/22 1402   97 5 °F (36 4 °C) 84 18 140/75 98 %      Temp Source Heart Rate Source Patient Position - Orthostatic VS BP Location FiO2 (%)   06/25/22 1319 06/25/22 1402 06/25/22 1402 06/25/22 1402 --   Temporal Monitor Lying Right arm       Pain Score       06/25/22 1402       No Pain           ROS: Positive for as above, the remainder of a 10 organ system ROS was otherwise unremarkable    General: awake, alert, no acute distress    Head: normocephalic, atraumatic    Eyes: no scleral icterus  Ears: external ears normal, hearing grossly intact  Nose: external exam grossly normal, negative nasal discharge  Neck: symmetric, No JVD noted, trachea midline  Pulmonary: no respiratory distress, no tachypnea noted  Cardiovascular: appears well perfused  Abdomen: no distention noted  Musculoskeletal: no deformities noted, tone normal  Neuro: grossly non-focal  Psych: mood and affect appropriate    The patient is stable and has a history and physical exam consistent with a viral illness  COVID19 testing has not been performed  I considered the patient's other medical conditions as applicable/noted above in my medical decision making  The patient is stable upon discharge  The plan is for supportive care at home  The patient (and any family present) verbalized understanding of the discharge instructions and warnings that would necessitate return to the Emergency Department  All questions were answered prior to discharge  Medications - No data to display  Final diagnoses:   COVID-19     Time reflects when diagnosis was documented in both MDM as applicable and the Disposition within this note     Time User Action Codes Description Comment    6/25/2022  2:04 PM Manual Roxanna Add [U07 1] COVID-19       ED Disposition     ED Disposition   Discharge    Condition   Stable    Date/Time   Sat Jun 25, 2022  2:04 PM    [facility] 30 discharge to home/self care                 Follow-up Information     Follow up With Specialties Details Why Contact Info Additional [address] [facility], [place] [facility]   [address]  [address] [place] [address]  [facility] [address] [facility] Emergency Medicine  If symptoms worsen [address] [place], [phone]  [address] [facility] Emergency Department, [address], [place], Luige Hunter [address]        Discharge Medication List as of 6/25/2022  2:07 PM      START taking these medications    Details   nirmatrelvir & ritonavir (Paxlovid) tablet therapy pack Take 3 tablets by mouth 2 (two) times a day for 5 days Take 2 nirmatrelvir tablets + 1 ritonavir tablet together per dose, Starting Sat 6/25/2022, Until u 6/30/2022, Normal           No discharge procedures on file      Electronically Signed by       Mulu Hall DO  06/25/22 4236

## 2022-06-26 NOTE — RESULT ENCOUNTER NOTE
Covid +-----paxlovid is option but prob Sx have been over 5 days--TW discussed that option 2x w/ pt and he preferred to wait--if he wants [facility]--paxlovid therapy pk--5 day--as directed

## 2022-06-27 ENCOUNTER — TELEPHONE (OUTPATIENT)
Dept: FAMILY MEDICINE CLINIC | Facility: CLINIC | Age: 72
End: 2022-06-27

## 2022-06-27 NOTE — TELEPHONE ENCOUNTER
I spoke with the patient to deliver TW's advice  Pt stated that our practice is dysfunctional and I should be sorry for how dysfunctional it is  Pt stated that he is very upset with how [facility] and Dr Mei Conception have handled this  Pt stated that he attempted to contact our office on Saturday for rx and advice but was unable to get through  Pt obtained the rx from the ot\[facility]\"" on 663  Pt stated that his sx began on 6/23/2022 but he has not begun the medication  Pt stated that he is unsure if he should begin the medication      When told that our office can write a f/u note to TW regarding his request for advice, patient yelled repeatedly over the phone regarding his disgust with the practice, Dr Cally Nieves handling of his request, and my "attitude "     End

## 2022-06-27 NOTE — TELEPHONE ENCOUNTER
Patient called in because he wants to discuss with someone if his covid recovery is on track  He said he wants to discuss  what are the symptoms of Covid and what he should be looking out for  Patient states he tested positive on Saturday 6/25

## 2022-06-27 NOTE — TELEPHONE ENCOUNTER
----- Message from Jaki Barker MD sent at 6/26/2022  7:55 AM EDT -----  Covid +-----paxlovid is option but prob Sx have been over 5 days--TW discussed that option 2x w/ pt and he preferred to wait--if he wants [facility]--paxlovid therapy pk--5 day--as directed

## 2022-07-05 ENCOUNTER — TELEPHONE (OUTPATIENT)
Dept: FAMILY MEDICINE CLINIC | Facility: CLINIC | Age: 72
End: 2022-07-05

## 2022-07-06 ENCOUNTER — RA CDI HCC (OUTPATIENT)
Dept: OTHER | Facility: HOSPITAL | Age: 72
End: 2022-07-06

## 2022-07-06 NOTE — PROGRESS NOTES
[facility]  coding opportunities       Chart reviewed, no opportunity found: CHART REVIEWED, NO OPPORTUNITY FOUND        Patients Insurance        Commercial Insurance: [employer]

## 2022-07-12 ENCOUNTER — TELEMEDICINE (OUTPATIENT)
Dept: FAMILY MEDICINE CLINIC | Facility: CLINIC | Age: 72
End: 2022-07-12
Payer: COMMERCIAL

## 2022-07-12 VITALS — WEIGHT: 260 LBS | BODY MASS INDEX: 34.46 KG/M2 | HEIGHT: 73 IN

## 2022-07-12 DIAGNOSIS — U07.1 COVID-19: Primary | ICD-10-CM

## 2022-07-12 PROCEDURE — 1160F RVW MEDS BY RX/DR IN RCRD: CPT | Performed by: FAMILY MEDICINE

## 2022-07-12 PROCEDURE — 99213 OFFICE O/P EST LOW 20 MIN: CPT | Performed by: FAMILY MEDICINE

## 2022-07-12 RX ORDER — COVID-19 ANTIGEN TEST
KIT MISCELLANEOUS
COMMUNITY
Start: 2022-06-24

## 2022-07-12 NOTE — PROGRESS NOTES
COVID-19 Outpatient Progress Note    Assessment/Plan:    Problem List Items Addressed This Visit    None     Visit Diagnoses     COVID-19    -  Primary         Disposition:     Patient has COVID-19 infection  Based off CDC guidelines, they were recommended to isolate for 5 days from the date of the positive test  If they remain asymptomatic, isolation may be ended followed by 5 days of wearing a mask when around othes to minimize risk of infecting others  If they have a fever, continue to stay home until fever resolves for at least 24 hours  I have spent 15 minutes directly with the patient  Greater than 50% of this time was spent in counseling/coordination of care regarding: impressions  Robitussin DM for cough  Keep rested and hydrated  Encounter provider Randal Anthony MD    Provider located at [address] [place] [phone]    Recent Visits  Date Type Provider Dept   07/05/22 Telephone [address] recent visits within past 7 days and meeting all other requirements  Today's Visits  Date Type Provider Dept   07/12/22 Telemedicine Randal Anthony MD [address] today's visits and meeting all other requirements  Future Appointments  No visits were found meeting these conditions  Showing future appointments within next 150 days and meeting all other requirements       Patient agrees to participate in a virtual check in via telephone or video visit instead of presenting to the office to address urgent/immediate medical needs  Patient is aware this is a billable service  After connecting through [facility], the patient was identified by name and date of birth  Goldy Zaragoza was informed that this was a telemedicine visit and that the exam was being conducted confidentially over secure lines  My office door was closed  No one else was in the room   Goldy Zaragoza acknowledged consent and understanding of privacy and security of the telemedicine visit  I informed the patient that I have reviewed his record in Epic and presented the opportunity for him to ask any questions regarding the visit today  The patient agreed to participate  Verification of patient location:  Patient is located in the following state in which I hold an active license: [place]    Subjective: Gissel Guzman is a 67 y o  male who has been screened for COVID-19  Symptom change since last report: resolving  Patient's symptoms include fatigue, nasal congestion, sore throat, cough and nausea  Patient denies fever, chills, malaise, rhinorrhea, anosmia, loss of taste, shortness of breath, chest tightness, abdominal pain, vomiting, diarrhea, myalgias and headaches  - Date of symptom onset: 6/23/2022  - Date of positive COVID-19 test: 6/24/2022  Type of test: PCR  COVID-19 vaccination status: Fully vaccinated with booster        Staying home and isolating themselves?: has not      Taking care not to share personal items?: is not      Cleaning all surfaces that are touched often?: is not      Wearing a mask when leaving room?: is not      Is back tp work  Lab Results   Component Value Date    SARSCOV2 Positive (A) 06/24/2022     Past Medical History:   Diagnosis Date    Back pain      History reviewed  No pertinent surgical history  Current Outpatient Medications   Medication Sig Dispense Refill    [employer] COVID-19 Ag Home Test KIT        No current facility-administered medications for this visit  No Known Allergies    Review of Systems   Constitutional: Positive for fatigue  Negative for chills and fever  HENT: Positive for congestion and sore throat  Negative for rhinorrhea  Respiratory: Positive for cough  Negative for chest tightness and shortness of breath  Gastrointestinal: Positive for nausea  Negative for abdominal pain, diarrhea and vomiting     Musculoskeletal: Negative for myalgias  Neurological: Negative for headaches  Objective:    Vitals:    07/12/22 1239   Weight: 118 kg (260 lb)   Height: 6' 1" (1 854 m)       Physical Exam  Constitutional:       General: He is not in acute distress  Appearance: Normal appearance  He is not ill-appearing  HENT:      Head: Normocephalic and atraumatic  Eyes:      Extraocular Movements: Extraocular movements intact  Pupils: Pupils are equal, round, and reactive to light  Pulmonary:      Effort: Pulmonary effort is normal  No respiratory distress  Neurological:      Mental Status: He is alert  Psychiatric:         Mood and Affect: Mood normal          Behavior: Behavior normal          VIRTUAL VISIT DISCLAIMER    Ej Clay verbally agrees to participate in [employer]  Pt is aware that [employer] could be limited without vital signs or the ability to perform a full hands-on physical [address] understands he or the provider may request at any time to terminate the video visit and request the patient to seek care or treatment in person

## 2022-11-03 ENCOUNTER — OFFICE VISIT (OUTPATIENT)
Dept: FAMILY MEDICINE CLINIC | Facility: CLINIC | Age: 72
End: 2022-11-03

## 2022-11-03 VITALS
BODY MASS INDEX: 38.22 KG/M2 | HEIGHT: 71 IN | SYSTOLIC BLOOD PRESSURE: 116 MMHG | DIASTOLIC BLOOD PRESSURE: 80 MMHG | OXYGEN SATURATION: 97 % | WEIGHT: 273 LBS | HEART RATE: 86 BPM

## 2022-11-03 DIAGNOSIS — R73.01 ELEVATED FASTING BLOOD SUGAR: ICD-10-CM

## 2022-11-03 DIAGNOSIS — G93.31 POSTVIRAL FATIGUE SYNDROME: ICD-10-CM

## 2022-11-03 DIAGNOSIS — Z00.00 MEDICARE ANNUAL WELLNESS VISIT, SUBSEQUENT: Primary | ICD-10-CM

## 2022-11-03 DIAGNOSIS — E78.2 HYPERLIPEMIA, MIXED: ICD-10-CM

## 2022-11-03 RX ORDER — PREDNISONE 20 MG/1
TABLET ORAL
Qty: 15 TABLET | Refills: 0 | Status: SHIPPED | OUTPATIENT
Start: 2022-11-03

## 2022-11-03 NOTE — PROGRESS NOTES
Assessment and Plan:   Get labs as ordered  This would best gait causes of fatigue  Reverse trend as to weight gain with proper diet, exercise, weight loss  Trial short course of prednisone once labs are none  Patient has sleep study scheduled for next week  He should have this done this this also may be part of his fatigue  Problem List Items Addressed This Visit        Other    Elevated fasting blood sugar    Relevant Orders    Basic metabolic panel    Hemoglobin A1c (w/out EAG)    Hyperlipemia, mixed      Other Visit Diagnoses     Medicare annual wellness visit, subsequent    -  Primary    Postviral fatigue syndrome        Relevant Medications    predniSONE 20 mg tablet    Other Relevant Orders    TSH, 3rd generation    Sedimentation rate, automated    C-reactive protein          Depression Screening and Follow-up Plan: Patient was screened for depression during today's encounter  They screened negative with a PHQ-2 score of 0  Preventive health issues were discussed with patient, and age appropriate screening tests were ordered as noted in patient's After Visit Summary  Personalized health advice and appropriate referrals for health education or preventive services given if needed, as noted in patient's After Visit Summary  History of Present Illness:     Patient presents for a Medicare Wellness Visit    Patient comes in today for Medicare wellness  Also has several medical issues  Patient had COVID back in the summer and has persisted with mild cough and some fatigue  Does have a history of impaired fasting glucose-no current medications  Hyperlipidemia last levels look good  Patient Care Team:  Qamar Watt MD as PCP - General     Review of Systems:     Review of Systems   Constitutional: Positive for fatigue  Negative for activity change, appetite change and diaphoresis  HENT: Negative for congestion, sinus pressure and sore throat  Respiratory: Positive for cough   Negative for chest tightness, shortness of breath and wheezing  Cardiovascular: Negative for chest pain, palpitations and leg swelling  Fast or slow heart rate   Gastrointestinal: Negative for abdominal pain, blood in stool, constipation, diarrhea, nausea and vomiting  Genitourinary: Negative for difficulty urinating, dysuria, frequency and hematuria  Musculoskeletal: Negative for arthralgias, gait problem, joint swelling and myalgias  Neurological: Negative for dizziness, light-headedness and headaches  Psychiatric/Behavioral: Negative for agitation, confusion, dysphoric mood and sleep disturbance  The patient is not nervous/anxious  Problem List:     Patient Active Problem List   Diagnosis   • Elevated fasting blood sugar   • Hyperlipemia, mixed      Past Medical and Surgical History:     Past Medical History:   Diagnosis Date   • Back pain      History reviewed  No pertinent surgical history     Family History:     Family History   Problem Relation Age of Onset   • Ovarian cancer Mother    • Thyroid disease Father    • Hypertension Father    • Heart disease Father    • Diabetes Brother    • Heart disease Brother    • Skin cancer Maternal Grandfather    • Colon cancer Neg Hx    • Colon polyps Neg Hx       Social History:     Social History     Socioeconomic History   • Marital status: Single     Spouse name: None   • Number of children: 0   • Years of education: None   • Highest education level: None   Occupational History   • Occupation: Manager of small power plant   Tobacco Use   • Smoking status: Never Smoker   • Smokeless tobacco: Never Used   Vaping Use   • Vaping Use: Never used   Substance and Sexual Activity   • Alcohol use: No     Comment: once a year   • Drug use: No   • Sexual activity: None   Other Topics Concern   • None   Social History Narrative   • None     Social Determinants of Health     Financial Resource Strain: Low Risk    • Difficulty of Paying Living Expenses: Not hard at all Food Insecurity: Not on file   Transportation Needs: No Transportation Needs   • Lack of Transportation (Medical): No   • Lack of Transportation (Non-Medical): No   Physical Activity: Not on file   Stress: Not on file   Social Connections: Not on file   Intimate Partner Violence: Not on file   Housing Stability: Not on file      Medications and Allergies:     Current Outpatient Medications   Medication Sig Dispense Refill   • predniSONE 20 mg tablet 1 tab twice daily for 5 days, then 1 tab daily for 5 days  15 tablet 0     No current facility-administered medications for this visit  No Known Allergies   Immunizations:     Immunization History   Administered Date(s) Administered   • COVID-19 MODERNA VACC 0 25 ML IM BOOSTER 11/12/2021   • COVID-19 MODERNA VACC 0 5 ML IM 01/21/2021, 02/19/2021   • COVID-19 Moderna Vac BIVALENT 18 Yr+ Im (BOOSTER ONLY) 10/10/2022      Health Maintenance:         Topic Date Due   • Hepatitis C Screening  Never done   • Colorectal Cancer Screening  06/26/2022         Topic Date Due   • Pneumococcal Vaccine: 65+ Years (1 - PCV) Never done   • COVID-19 Vaccine (4 - Booster for Moderna series) 03/12/2022   • Influenza Vaccine (1) Never done      Medicare Screening Tests and Risk Assessments:     Amy Chavez is here for his Subsequent Wellness visit  Last Medicare Wellness visit information reviewed, patient interviewed, no change since last AWV  Health Risk Assessment:   Patient rates overall health as very good  Patient feels that their physical health rating is slightly worse  Patient is satisfied with their life  Eyesight was rated as same  Hearing was rated as same  Patient feels that their emotional and mental health rating is same  Patients states they are sometimes angry  Patient states they are sometimes unusually tired/fatigued  Pain experienced in the last 7 days has been none  Patient states that he has experienced weight loss or gain in last 6 months       Depression Screening: PHQ-2 Score: 0      Fall Risk Screening: In the past year, patient has experienced: no history of falling in past year      Home Safety:  Patient does not have trouble with stairs inside or outside of their home  Patient has working smoke alarms and has working carbon monoxide detector  Home safety hazards include: none  Nutrition:   Current diet is Regular  Medications:   Patient is not currently taking any over-the-counter supplements  Patient is able to manage medications  Activities of Daily Living (ADLs)/Instrumental Activities of Daily Living (IADLs):   Walk and transfer into and out of bed and chair?: Yes  Dress and groom yourself?: Yes    Bathe or shower yourself?: Yes    Feed yourself?  Yes  Do your laundry/housekeeping?: Yes  Manage your money, pay your bills and track your expenses?: Yes  Make your own meals?: Yes    Do your own shopping?: Yes    Previous Hospitalizations:   Any hospitalizations or ED visits within the last 12 months?: Yes    How many hospitalizations have you had in the last year?: 1-2    Advance Care Planning:   Living will: No    Durable POA for healthcare: No    Advanced directive: No    Five wishes given: Yes      Cognitive Screening:   Provider or family/friend/caregiver concerned regarding cognition?: No    PREVENTIVE SCREENINGS      Cardiovascular Screening:    General: Screening Not Indicated and History Lipid Disorder      Diabetes Screening:     General: Screening Current      Colorectal Cancer Screening:     General: Screening Current      Prostate Cancer Screening:    General: Screening Current      Abdominal Aortic Aneurysm (AAA) Screening:    Risk factors include: age between 73-67 yo        Lung Cancer Screening:     General: Screening Not Indicated      Hepatitis C Screening:    General: Risks and Benefits Discussed    Screening, Brief Intervention, and Referral to Treatment (SBIRT)    Screening  Typical number of drinks in a day: 0  Typical number of drinks in a week: 0  Interpretation: Low risk drinking behavior  AUDIT-C Screenin) How often did you have a drink containing alcohol in the past year? monthly or less  2) How many drinks did you have on a typical day when you were drinking in the past year? 1 to 2  3) How often did you have 6 or more drinks on one occasion in the past year? never    AUDIT-C Score: 1  Interpretation: Score 0-3 (male): Negative screen for alcohol misuse    Single Item Drug Screening:  How often have you used an illegal drug (including marijuana) or a prescription medication for non-medical reasons in the past year? never    Single Item Drug Screen Score: 0  Interpretation: Negative screen for possible drug use disorder    Brief Intervention  Alcohol & drug use screenings were reviewed  No concerns regarding substance use disorder identified  No exam data present     Physical Exam:     /80   Pulse 86   Ht 5' 10 5" (1 791 m)   Wt 124 kg (273 lb)   SpO2 97%   BMI 38 62 kg/m²     Physical Exam  Vitals and nursing note reviewed  Constitutional:       General: He is not in acute distress  Appearance: He is not ill-appearing  HENT:      Head: Normocephalic and atraumatic  Right Ear: Tympanic membrane, ear canal and external ear normal       Left Ear: Tympanic membrane, ear canal and external ear normal       Nose: Nose normal       Mouth/Throat:      Pharynx: No posterior oropharyngeal erythema  Eyes:      General:         Right eye: No discharge  Left eye: No discharge  Extraocular Movements: Extraocular movements intact  Conjunctiva/sclera: Conjunctivae normal       Pupils: Pupils are equal, round, and reactive to light  Neck:      Thyroid: No thyromegaly  Vascular: No carotid bruit  Trachea: No tracheal deviation  Cardiovascular:      Rate and Rhythm: Normal rate and regular rhythm  Heart sounds: Normal heart sounds  No murmur heard    Pulmonary:      Effort: Pulmonary effort is normal  No respiratory distress  Breath sounds: Normal breath sounds  No wheezing  Abdominal:      General: Bowel sounds are normal  There is no distension  Palpations: Abdomen is soft  There is no mass  Tenderness: There is no abdominal tenderness  There is no guarding  Musculoskeletal:      Cervical back: Normal range of motion and neck supple  Right lower leg: No edema  Left lower leg: No edema  Lymphadenopathy:      Cervical: No cervical adenopathy  Skin:     Findings: No rash  Neurological:      General: No focal deficit present  Mental Status: He is alert and oriented to person, place, and time  Cranial Nerves: No cranial nerve deficit        Deep Tendon Reflexes: Reflexes normal    Psychiatric:         Mood and Affect: Mood normal          Behavior: Behavior normal           Dona Mason MD

## 2022-11-03 NOTE — PATIENT INSTRUCTIONS
Get labs as ordered  This would best gait causes of fatigue  Reverse trend as to weight gain with proper diet, exercise, weight loss  Trial short course of prednisone once labs are none  Patient has sleep study scheduled for next week  He should have this done this this also may be part of his fatigue  Medicare Preventive Visit Patient Instructions  Thank you for completing your Welcome to Medicare Visit or Medicare Annual Wellness Visit today  Your next wellness visit will be due in one year (11/4/2023)  The screening/preventive services that you may require over the next 5-10 years are detailed below  Some tests may not apply to you based off risk factors and/or age  Screening tests ordered at today's visit but not completed yet may show as past due  Also, please note that scanned in results may not display below  Preventive Screenings:  Service Recommendations Previous Testing/Comments   Colorectal Cancer Screening  Colonoscopy    Fecal Occult Blood Test (FOBT)/Fecal Immunochemical Test (FIT)  Fecal DNA/Cologuard Test  Flexible Sigmoidoscopy Age: 39-70 years old   Colonoscopy: every 10 years (May be performed more frequently if at higher risk)  OR  FOBT/FIT: every 1 year  OR  Cologuard: every 3 years  OR  Sigmoidoscopy: every 5 years  Screening may be recommended earlier than age 39 if at higher risk for colorectal cancer  Also, an individualized decision between you and your healthcare provider will decide whether screening between the ages of 74-80 would be appropriate   Colonoscopy: 06/26/2019  FOBT/FIT: 06/19/2020  Cologuard: Not on file  Sigmoidoscopy: Not on file    Screening Current     Prostate Cancer Screening Individualized decision between patient and health care provider in men between ages of 53-78   Medicare will cover every 12 months beginning on the day after your 50th birthday PSA: 0 8 ng/mL     Screening Current     Hepatitis C Screening Once for adults born between 1945 and 1965  More frequently in patients at high risk for Hepatitis C Hep C Antibody: Not on file        Diabetes Screening 1-2 times per year if you're at risk for diabetes or have pre-diabetes Fasting glucose: No results in last 5 years (No results in last 5 years)  A1C: 5 1 % of total Hgb (4/6/2022)  Screening Current   Cholesterol Screening Once every 5 years if you don't have a lipid disorder  May order more often based on risk factors  Lipid panel: 04/06/2022  Screening Not Indicated  History Lipid Disorder      Other Preventive Screenings Covered by Medicare:  Abdominal Aortic Aneurysm (AAA) Screening: covered once if your at risk  You're considered to be at risk if you have a family history of AAA or a male between the age of 73-68 who smoking at least 100 cigarettes in your lifetime  Lung Cancer Screening: covers low dose CT scan once per year if you meet all of the following conditions: (1) Age 50-69; (2) No signs or symptoms of lung cancer; (3) Current smoker or have quit smoking within the last 15 years; (4) You have a tobacco smoking history of at least 20 pack years (packs per day x number of years you smoked); (5) You get a written order from a healthcare provider  Glaucoma Screening: covered annually if you're considered high risk: (1) You have diabetes OR (2) Family history of glaucoma OR (3)  aged 48 and older OR (3)  American aged 72 and older  Osteoporosis Screening: covered every 2 years if you meet one of the following conditions: (1) Have a vertebral abnormality; (2) On glucocorticoid therapy for more than 3 months; (3) Have primary hyperparathyroidism; (4) On osteoporosis medications and need to assess response to drug therapy  HIV Screening: covered annually if you're between the age of 12-76  Also covered annually if you are younger than 13 and older than 72 with risk factors for HIV infection   For pregnant patients, it is covered up to 3 times per pregnancy  Immunizations:  Immunization Recommendations   Influenza Vaccine Annual influenza vaccination during flu season is recommended for all persons aged >= 6 months who do not have contraindications   Pneumococcal Vaccine   * Pneumococcal conjugate vaccine = PCV13 (Prevnar 13), PCV15 (Vaxneuvance), PCV20 (Prevnar 20)  * Pneumococcal polysaccharide vaccine = PPSV23 (Pneumovax) Adults 25-60 years old: 1-3 doses may be recommended based on certain risk factors  Adults 72 years old: 1-2 doses may be recommended based off what pneumonia vaccine you previously received   Hepatitis B Vaccine 3 dose series if at intermediate or high risk (ex: diabetes, end stage renal disease, liver disease)   Tetanus (Td) Vaccine - COST NOT COVERED BY MEDICARE PART B Following completion of primary series, a booster dose should be given every 10 years to maintain immunity against tetanus  Td may also be given as tetanus wound prophylaxis  Tdap Vaccine - COST NOT COVERED BY MEDICARE PART B Recommended at least once for all adults  For pregnant patients, recommended with each pregnancy  Shingles Vaccine (Shingrix) - COST NOT COVERED BY MEDICARE PART B  2 shot series recommended in those aged 48 and above     Health Maintenance Due:      Topic Date Due    Hepatitis C Screening  Never done    Colorectal Cancer Screening  06/26/2022     Immunizations Due:      Topic Date Due    Pneumococcal Vaccine: 65+ Years (1 - PCV) Never done    COVID-19 Vaccine (4 - Booster for Moderna series) 03/12/2022    Influenza Vaccine (1) Never done     Advance Directives   What are advance directives? Advance directives are legal documents that state your wishes and plans for medical care  These plans are made ahead of time in case you lose your ability to make decisions for yourself  Advance directives can apply to any medical decision, such as the treatments you want, and if you want to donate organs  What are the types of advance directives? There are many types of advance directives, and each state has rules about how to use them  You may choose a combination of any of the following:  Living will: This is a written record of the treatment you want  You can also choose which treatments you do not want, which to limit, and which to stop at a certain time  This includes surgery, medicine, IV fluid, and tube feedings  Durable power of  for healthcare [facility]): This is a written record that states who you want to make healthcare choices for you when you are unable to make them for yourself  This person, called a proxy, is usually a family member or a friend  You may choose more than 1 proxy  Do not resuscitate (DNR) order:  A DNR order is used in case your heart stops beating or you stop breathing  It is a request not to have certain forms of treatment, such as CPR  A DNR order may be included in other types of advance directives  Medical directive: This covers the care that you want if you are in a coma, near death, or unable to make decisions for yourself  You can list the treatments you want for each condition  Treatment may include pain medicine, surgery, blood transfusions, dialysis, IV or tube feedings, and a ventilator (breathing machine)  Values history: This document has questions about your views, beliefs, and how you feel and think about life  This information can help others choose the care that you would choose  Why are advance directives important? An advance directive helps you control your care  Although spoken wishes may be used, it is better to have your wishes written down  Spoken wishes can be misunderstood, or not followed  Treatments may be given even if you do not want them  An advance directive may make it easier for your family to make difficult choices about your care     Weight Management   Why it is important to manage your weight:  Being overweight increases your risk of health conditions such as heart disease, high blood pressure, type 2 diabetes, and certain types of cancer  It can also increase your risk for osteoarthritis, sleep apnea, and other respiratory problems  Aim for a slow, steady weight loss  Even a small amount of weight loss can lower your risk of health problems  How to lose weight safely:  A safe and healthy way to lose weight is to eat fewer calories and get regular exercise  You can lose up about 1 pound a week by decreasing the number of calories you eat by 500 calories each day  Healthy meal plan for weight management:  A healthy meal plan includes a variety of foods, contains fewer calories, and helps you stay healthy  A healthy meal plan includes the following:  Eat whole-grain foods more often  A healthy meal plan should contain fiber  Fiber is the part of grains, fruits, and vegetables that is not broken down by your body  Whole-grain foods are healthy and provide extra fiber in your diet  Some examples of whole-grain foods are whole-wheat breads and pastas, oatmeal, brown rice, and bulgur  Eat a variety of vegetables every day  Include dark, leafy greens such as spinach, kale, pawan greens, and mustard greens  Eat yellow and orange vegetables such as carrots, sweet potatoes, and winter squash  Eat a variety of fruits every day  Choose fresh or canned fruit (canned in its own juice or light syrup) instead of juice  Fruit juice has very little or no fiber  Eat low-fat dairy foods  Drink fat-free (skim) milk or 1% milk  Eat fat-free yogurt and low-fat cottage cheese  Try low-fat cheeses such as mozzarella and other reduced-fat cheeses  Choose meat and other protein foods that are low in fat  Choose beans or other legumes such as split peas or lentils  Choose fish, skinless poultry (chicken or turkey), or lean cuts of red meat (beef or pork)  Before you cook meat or poultry, cut off any visible fat  Use less fat and oil  Try baking foods instead of frying them   Add less fat, such as margarine, sour cream, regular salad dressing and mayonnaise to foods  Eat fewer high-fat foods  Some examples of high-fat foods include french fries, doughnuts, ice cream, and cakes  Eat fewer sweets  Limit foods and drinks that are high in sugar  This includes candy, cookies, regular soda, and sweetened drinks  Exercise:  Exercise at least 30 minutes per day on most days of the week  Some examples of exercise include walking, biking, dancing, and swimming  You can also fit in more physical activity by taking the stairs instead of the elevator or parking farther away from stores  Ask your healthcare provider about the best exercise plan for you  © Copyright [employer] 2018 Information is for End User's use only and may not be sold, redistributed or otherwise used for commercial purposes   All illustrations and images included in CareNotes® are the copyrighted property of [employer]  or [address] [place] [address]

## 2022-11-09 ENCOUNTER — OFFICE VISIT (OUTPATIENT)
Dept: FAMILY MEDICINE CLINIC | Facility: CLINIC | Age: 72
End: 2022-11-09

## 2022-11-09 VITALS — TEMPERATURE: 98 F | HEART RATE: 76 BPM | OXYGEN SATURATION: 97 %

## 2022-11-09 DIAGNOSIS — H61.23 BILATERAL IMPACTED CERUMEN: Primary | ICD-10-CM

## 2022-11-09 NOTE — PROGRESS NOTES
[facility]        NAME: Abdiaziz Chicas is a 67 y o  male  : 1950    MRN: [id number]  DATE: 2022  TIME: 1:56 PM    Assessment and Plan   Bilateral impacted cerumen [H61 23]  1  Bilateral impacted cerumen  Ear cerumen removal         Patient Instructions     Patient Instructions   Continue Prednisone as ordered by Dr Marilee Frost  Have labs done 7-10 days after you finish Prednisone  Ceruminosis is noted  Wax is removed by syringing and manual debridement  Instructions for home care to prevent wax buildup are given  Chief Complaint     Chief Complaint   Patient presents with   • Earache     Right ear pain x2 days  Throbbing has subsided  History of Present Illness       Saw Dr Stevie Lomeli last week and told him that his cough from Covid in the summer has never completely left   the cough has really gotten worse, yesterday his ear was bothering him and really started pounding- not sure if his ear is impacted or what is going on  Slight fever 100 over the last couple of days it comes and goes, general fatigue  Has not taken a Covid test  Taking prednisone, sudafed its helping a little       Review of Systems   Review of Systems   Constitutional: Positive for activity change, fatigue and fever  Negative for diaphoresis  HENT: Positive for ear pain, postnasal drip, sinus pressure and sinus pain  Negative for congestion, facial swelling, hearing loss, rhinorrhea, sneezing, sore throat and voice change  Eyes: Negative for discharge and visual disturbance  Respiratory: Positive for cough  Negative for choking, chest tightness, shortness of breath, wheezing and stridor  Cardiovascular: Negative for chest pain, palpitations and leg swelling  Gastrointestinal: Negative for abdominal distention, abdominal pain, constipation, diarrhea, nausea and vomiting  Endocrine: Negative for polydipsia, polyphagia and polyuria     Genitourinary: Negative for difficulty urinating, dysuria, frequency and urgency  Musculoskeletal: Negative for arthralgias, back pain, gait problem, joint swelling, myalgias, neck pain and neck stiffness  Skin: Negative for color change, rash and wound  Neurological: Positive for headaches  Negative for dizziness, syncope, speech difficulty, weakness and light-headedness  Hematological: Negative for adenopathy  Does not bruise/bleed easily  Psychiatric/Behavioral: Negative for agitation, behavioral problems, confusion, hallucinations, sleep disturbance and suicidal ideas  The patient is not nervous/anxious  Current Medications       Current Outpatient Medications:   •  predniSONE 20 mg tablet, 1 tab twice daily for 5 days, then 1 tab daily for 5 days  , Disp: 15 tablet, Rfl: 0    Current Allergies     Allergies as of 11/09/2022   • (No Known Allergies)            The following portions of the patient's history were reviewed and updated as appropriate: allergies, current medications, past family history, past medical history, past social history, past surgical history and problem list      Past Medical History:   Diagnosis Date   • Back pain        History reviewed  No pertinent surgical history  Family History   Problem Relation Age of Onset   • Ovarian cancer Mother    • Thyroid disease Father    • Hypertension Father    • Heart disease Father    • Diabetes Brother    • Heart disease Brother    • Skin cancer Maternal Grandfather    • Colon cancer Neg Hx    • Colon polyps Neg Hx          Medications have been verified  Objective   Pulse 76   Temp 98 °F (36 7 °C) (Oral)   SpO2 97%        Physical Exam     Physical Exam  Vitals and nursing note reviewed  Constitutional:       General: He is not in acute distress  Appearance: Normal appearance  He is well-developed  He is obese  He is not diaphoretic  HENT:      Head: Normocephalic and atraumatic        Right Ear: Tympanic membrane, ear canal and external ear normal  There is impacted cerumen  Left Ear: Tympanic membrane, ear canal and external ear normal  There is impacted cerumen  Nose: Nose normal  No congestion or rhinorrhea  Right Sinus: No maxillary sinus tenderness or frontal sinus tenderness  Left Sinus: No maxillary sinus tenderness or frontal sinus tenderness  Mouth/Throat:      Pharynx: Uvula midline  Posterior oropharyngeal erythema present  No oropharyngeal exudate  Eyes:      General:         Right eye: No discharge  Left eye: No discharge  Conjunctiva/sclera: Conjunctivae normal       Pupils: Pupils are equal, round, and reactive to light  Neck:      Thyroid: No thyromegaly  Vascular: No carotid bruit  Trachea: No tracheal deviation  Cardiovascular:      Rate and Rhythm: Normal rate and regular rhythm  Pulses: Normal pulses  Heart sounds: Normal heart sounds  No murmur heard  No friction rub  No gallop  Pulmonary:      Effort: Pulmonary effort is normal  No respiratory distress  Breath sounds: Normal breath sounds  No stridor  No wheezing, rhonchi or rales  Chest:      Chest wall: No tenderness  Abdominal:      General: Bowel sounds are normal  There is no distension  Palpations: Abdomen is soft  There is no mass  Tenderness: There is no abdominal tenderness  There is no guarding or rebound  Hernia: No hernia is present  Musculoskeletal:         General: No swelling, tenderness, deformity or signs of injury  Normal range of motion  Cervical back: Normal range of motion and neck supple  No rigidity or tenderness  Lymphadenopathy:      Cervical: No cervical adenopathy  Skin:     General: Skin is warm and dry  Capillary Refill: Capillary refill takes less than 2 seconds  Coloration: Skin is not jaundiced or pale  Findings: No bruising, erythema, lesion or rash     Neurological:      Mental Status: He is alert and oriented to person, place, and time       Cranial Nerves: No cranial nerve deficit  Sensory: No sensory deficit  Motor: No weakness  Coordination: Coordination normal    Psychiatric:         Speech: Speech normal          Behavior: Behavior normal          Thought Content: Thought content normal          Judgment: Judgment normal        Ear cerumen removal    Date/Time: 11/9/2022 1:53 PM  Performed by: [facility]  Authorized by: [facility]   Universal Protocol:  Risks and benefits: risks, benefits and alternatives were discussed  Consent given by: patient  Patient understanding: patient states understanding of the procedure being performed  Patient identity confirmed: verbally with patient      Patient location:  Clinic  Procedure details:     Location:  L ear and R ear    Procedure type: irrigation only    Post-procedure details:     Complication:  None    Hearing quality:  Improved    Patient tolerance of procedure:   Tolerated well, no immediate complications

## 2022-11-09 NOTE — PATIENT INSTRUCTIONS
Continue Prednisone as ordered by Dr Blanco Stanley  Have labs done 7-10 days after you finish Prednisone  Ceruminosis is noted  Wax is removed by syringing and manual debridement  Instructions for home care to prevent wax buildup are given

## 2022-11-29 ENCOUNTER — TELEMEDICINE (OUTPATIENT)
Dept: FAMILY MEDICINE CLINIC | Facility: CLINIC | Age: 72
End: 2022-11-29

## 2022-11-29 DIAGNOSIS — R05.8 POST-VIRAL COUGH SYNDROME: Primary | ICD-10-CM

## 2022-11-29 NOTE — PATIENT INSTRUCTIONS
Discussed viral vs allergic vs bacterial infection  Flonase and OTC allergy medication as directed daily  OTC cough/cold medications as directed  Call or return for problems/concerns

## 2022-11-29 NOTE — PROGRESS NOTES
Virtual Regular Visit    Verification of patient location:    Patient is located in the following state in which I hold an active license [place]      Assessment/Plan:    Problem List Items Addressed This Visit    None  Visit Diagnoses     Post-viral cough syndrome    -  Primary               Reason for visit is   Chief Complaint   Patient presents with   • Cough     Thinks he may have RSV   • Virtual Regular Visit        Encounter provider Chuckie Suazo, [address]    Provider located at [address] [facility] [address]      Recent Visits  No visits were found meeting these conditions  Showing recent visits within past 7 days and meeting all other requirements  Today's Visits  Date Type Provider Dept   11/29/22 Telemedicine [facility]   Showing today's visits and meeting all other requirements  Future Appointments  No visits were found meeting these conditions  Showing future appointments within next 150 days and meeting all other requirements       The patient was identified by name and date of birth  Luis Alex was informed that this is a telemedicine visit and that the visit is being conducted through the [employer]  He agrees to proceed     My office door was closed  No one else was in the room  He acknowledged consent and understanding of privacy and security of the video platform  The patient has agreed to participate and understands they can discontinue the visit at any time  Patient is aware this is a billable service  Nessa Kohler is a 67 y o  male    C/o ongoing cough  States has had cough since having Covid in the Summer  Cough has been persistent, started with pressure in ears  Ears flushed last week for impacted cerumen  Still has pressure in ears  He is not taking any OTC medications  Denies shortness of breath  No fever  Treated with Prednisone 11/3/22  Cough  Associated symptoms include ear pain, postnasal drip and rhinorrhea  Pertinent negatives include no chest pain, chills, eye redness, fever, headaches, myalgias, rash, sore throat or wheezing  Past Medical History:   Diagnosis Date   • Back pain        History reviewed  No pertinent surgical history  Current Outpatient Medications   Medication Sig Dispense Refill   • predniSONE 20 mg tablet 1 tab twice daily for 5 days, then 1 tab daily for 5 days  (Patient not taking: Reported on 11/29/2022) 15 tablet 0     No current facility-administered medications for this visit  No Known Allergies    Review of Systems   Constitutional: Negative for activity change, chills, fatigue and fever  HENT: Positive for congestion, ear pain, postnasal drip and rhinorrhea  Negative for sinus pressure and sore throat  Eyes: Negative for pain, discharge and redness  Respiratory: Positive for cough  Negative for wheezing  Cardiovascular: Negative for chest pain  Gastrointestinal: Negative for constipation, diarrhea, nausea and vomiting  Musculoskeletal: Negative for myalgias  Skin: Negative for rash  Neurological: Negative for dizziness and headaches  Video Exam    There were no vitals filed for this visit  Physical Exam  Constitutional:       General: He is not in acute distress  Appearance: Normal appearance  He is not ill-appearing  HENT:      Head: Normocephalic  Eyes:      Extraocular Movements: Extraocular movements intact  Pulmonary:      Effort: Pulmonary effort is normal  No respiratory distress  Skin:     Coloration: Skin is not pale  Findings: No erythema  Neurological:      Mental Status: He is alert     Psychiatric:         Mood and Affect: Mood normal          Behavior: Behavior normal           I spent 20 minutes directly with the patient during this visit

## 2023-01-09 ENCOUNTER — OFFICE VISIT (OUTPATIENT)
Dept: URGENT CARE | Facility: CLINIC | Age: 73
End: 2023-01-09

## 2023-01-09 VITALS
OXYGEN SATURATION: 99 % | TEMPERATURE: 98.4 F | HEART RATE: 77 BPM | SYSTOLIC BLOOD PRESSURE: 143 MMHG | DIASTOLIC BLOOD PRESSURE: 75 MMHG | RESPIRATION RATE: 18 BRPM

## 2023-01-09 DIAGNOSIS — L03.818 CELLULITIS OF OTHER SPECIFIED SITE: Primary | ICD-10-CM

## 2023-01-09 RX ORDER — CEPHALEXIN 500 MG/1
500 CAPSULE ORAL EVERY 8 HOURS SCHEDULED
Qty: 30 CAPSULE | Refills: 0 | Status: SHIPPED | OUTPATIENT
Start: 2023-01-09 | End: 2023-01-19

## 2023-01-09 NOTE — PROGRESS NOTES
[facility]        NAME: Reanna Whitman is a 68 y o  male  : 1950    MRN: [id number]  DATE: 2023  TIME: 4:11 PM    /75   Pulse 77   Temp 98 4 °F (36 9 °C)   Resp 18   SpO2 99%     Assessment and Plan   Cellulitis of other specified site [L03 818]  1  Cellulitis of other specified site  cephalexin (KEFLEX) 500 mg capsule            Patient Instructions       Follow up with PCP in 3-5 days  Proceed to  ER if symptoms worsen  Chief Complaint     Chief Complaint   Patient presents with   • Thumb Injury     Pt reports noted bleeding around distal L thumb nail this weekend  Pt denies known injury  Distal thumb redness, swelling, non-painful  History of Present Illness       Pt with left thumb swelling and slight erythema x 1 day , no known injury       Review of Systems   Review of Systems   Constitutional: Negative  HENT: Negative  Eyes: Negative  Respiratory: Negative  Cardiovascular: Negative  Gastrointestinal: Negative  Endocrine: Negative  Genitourinary: Negative  Musculoskeletal: Negative  Skin: Negative  Allergic/Immunologic: Negative  Neurological: Negative  Hematological: Negative  Psychiatric/Behavioral: Negative  All other systems reviewed and are negative  Current Medications       Current Outpatient Medications:   •  cephalexin (KEFLEX) 500 mg capsule, Take 1 capsule (500 mg total) by mouth every 8 (eight) hours for 10 days, Disp: 30 capsule, Rfl: 0  •  predniSONE 20 mg tablet, 1 tab twice daily for 5 days, then 1 tab daily for 5 days  , Disp: 15 tablet, Rfl: 0    Current Allergies     Allergies as of 2023   • (No Known Allergies)            The following portions of the patient's history were reviewed and updated as appropriate: allergies, current medications, past family history, past medical history, past social history, past surgical history and problem list      Past Medical History:   Diagnosis Date   • Back pain        History reviewed  No pertinent surgical history  Family History   Problem Relation Age of Onset   • Ovarian cancer Mother    • Thyroid disease Father    • Hypertension Father    • Heart disease Father    • Diabetes Brother    • Heart disease Brother    • Skin cancer Maternal Grandfather    • Cancer Family    • Colon cancer Neg Hx    • Colon polyps Neg Hx          Medications have been verified  Objective   /75   Pulse 77   Temp 98 4 °F (36 9 °C)   Resp 18   SpO2 99%        Physical Exam     Physical Exam  Vitals and nursing note reviewed  Constitutional:       Appearance: Normal appearance  He is normal weight  HENT:      Head: Normocephalic and atraumatic  Right Ear: Tympanic membrane, ear canal and external ear normal       Left Ear: Tympanic membrane, ear canal and external ear normal       Nose: Nose normal       Mouth/Throat:      Mouth: Mucous membranes are moist       Pharynx: Oropharynx is clear  Eyes:      Extraocular Movements: Extraocular movements intact  Conjunctiva/sclera: Conjunctivae normal       Pupils: Pupils are equal, round, and reactive to light  Cardiovascular:      Rate and Rhythm: Normal rate  Pulses: Normal pulses  Heart sounds: Normal heart sounds  Pulmonary:      Effort: Pulmonary effort is normal       Breath sounds: Normal breath sounds  Abdominal:      General: Bowel sounds are normal       Palpations: Abdomen is soft  Musculoskeletal:         General: Normal range of motion  Cervical back: Normal range of motion and neck supple  Comments: Left thumb  Distal pad slight erythema  Minor tenderness from all joints    Skin:     General: Skin is warm  Capillary Refill: Capillary refill takes less than 2 seconds  Neurological:      General: No focal deficit present  Mental Status: He is alert and oriented to person, place, and time     Psychiatric:         Mood and Affect: Mood normal  Behavior: Behavior normal

## 2023-01-11 ENCOUNTER — HOSPITAL ENCOUNTER (EMERGENCY)
Facility: HOSPITAL | Age: 73
Discharge: HOME | End: 2023-01-11
Attending: EMERGENCY MEDICINE | Admitting: EMERGENCY MEDICINE

## 2023-01-11 VITALS
HEIGHT: 72 IN | HEART RATE: 65 BPM | BODY MASS INDEX: 37.11 KG/M2 | RESPIRATION RATE: 18 BRPM | SYSTOLIC BLOOD PRESSURE: 159 MMHG | WEIGHT: 274 LBS | TEMPERATURE: 98.3 F | DIASTOLIC BLOOD PRESSURE: 84 MMHG | OXYGEN SATURATION: 98 %

## 2023-01-11 DIAGNOSIS — L03.012 CELLULITIS OF LEFT THUMB: Primary | ICD-10-CM

## 2023-01-11 RX ORDER — SULFAMETHOXAZOLE AND TRIMETHOPRIM 800; 160 MG/1; MG/1
1 TABLET ORAL 2 TIMES DAILY
Qty: 14 TABLET | Refills: 0 | Status: SHIPPED | OUTPATIENT
Start: 2023-01-11 | End: 2023-01-18

## 2023-01-11 NOTE — ED PROVIDER NOTES
History  Chief Complaint   Patient presents with   • Thumb Swelling     Patient presents to ED with left thumb redness, patient reports slight swelling sensation  Patient reports being seen by urgent care  and given antibiotics, patient concerned for worsening infection  Patient reports keflex dose not being appropriate for his weight  77-year-old male presents for evaluation of reevaluation of left thumb redness and swelling which patient states has been worsening over the past 4 days  Patient had initially gone to urgent care where he was prescribed Keflex for thumb cellulitis  Patient states that he has not noticed any improvement since starting the antibiotic  Patient denies fevers or chills  No other rashes or lesions  Denies injury to the area  Prior to Admission Medications   Prescriptions Last Dose Informant Patient Reported? Taking? cephalexin (KEFLEX) 500 mg capsule   No No   Sig: Take 1 capsule (500 mg total) by mouth every 8 (eight) hours for 10 days   predniSONE 20 mg tablet   No No   Si tab twice daily for 5 days, then 1 tab daily for 5 days  Facility-Administered Medications: None       Past Medical History:   Diagnosis Date   • Back pain        No past surgical history on file  Family History   Problem Relation Age of Onset   • Ovarian cancer Mother    • Thyroid disease Father    • Hypertension Father    • Heart disease Father    • Diabetes Brother    • Heart disease Brother    • Skin cancer Maternal Grandfather    • Cancer Family    • Colon cancer Neg Hx    • Colon polyps Neg Hx      I have reviewed and agree with the history as documented      E-Cigarette/Vaping   • E-Cigarette Use Never User      E-Cigarette/Vaping Substances   • Nicotine No    • THC No    • CBD No    • Flavoring No    • Other No    • Unknown No      Social History     Tobacco Use   • Smoking status: Never   • Smokeless tobacco: Never   Vaping Use   • Vaping Use: Never used   Substance Use Topics   • Alcohol use: No     Comment: once a year   • Drug use: No       Review of Systems    Physical Exam  Physical Exam  Vitals and nursing note reviewed  Constitutional:       Appearance: Normal appearance  HENT:      Head: Normocephalic and atraumatic  Cardiovascular:      Rate and Rhythm: Normal rate and regular rhythm  Pulses: Normal pulses  Skin:     General: Skin is warm and dry  Comments: Distal erythema of the left thumb  No appreciable swelling  No bony tenderness  No tenderness over the flexor tendon  Full range of motion  Neurological:      Mental Status: He is alert  Vital Signs  ED Triage Vitals [01/11/23 1040]   Temperature Pulse Respirations Blood Pressure SpO2   98 3 °F (36 8 °C) 65 18 159/84 98 %      Temp src Heart Rate Source Patient Position - Orthostatic VS BP Location FiO2 (%)   -- Monitor Sitting Left arm --      Pain Score       --           Vitals:    01/11/23 1040   BP: 159/84   Pulse: 65   Patient Position - Orthostatic VS: Sitting         Visual Acuity      ED Medications  Medications - No data to display    Diagnostic Studies  Results Reviewed     None                 No orders to display              Procedures  Procedures         ED Course                                             Medical Decision Making  49-year-old male presents for evaluation of worsening erythema of the left thumb  No notable swelling on exam   Erythema is mild and limited to the distal aspect of the digit  Full range of motion with no pain  Will add Bactrim for better staph coverage  PCP follow-up if not improving  Cellulitis of left thumb: acute illness or injury  Risk  Prescription drug management            Disposition  Final diagnoses:   Cellulitis of left thumb     Time reflects when diagnosis was documented in both MDM as applicable and the Disposition within this note     Time User Action Codes Description Comment    1/11/2023 10:55 AM Sis Fabian Add [L03 012] Cellulitis of left thumb       ED Disposition     ED Disposition   Discharge    Condition   Stable    Date/Time   Wed Jan 11, 2023 10:56 AM    Comment   Pagan LUCRECIA Obed discharge to home/self care  Follow-up Information     Follow up With Specialties Details Why Contact Info Additional Information    Gagandeep Cain MD Family Medicine Schedule an appointment as soon as possible for a visit in 3 days For reevaluation of symptoms or not improving [address] [facility] [address]  [address] [facility] Emergency Department Emergency Medicine Go to  If symptoms worsen [address] [address]  [address] [facility] Emergency Department, [address] [place] [address] [address]          Patient's Medications   Discharge Prescriptions    SULFAMETHOXAZOLE-TRIMETHOPRIM (BACTRIM DS) 800-160 MG PER TABLET    Take 1 tablet by mouth 2 (two) times a day for 7 days smx-tmp DS (BACTRIM) 800-160 mg tabs (1tab q12 D10)       Start Date: 1/11/2023 End Date: 1/18/2023       Order Dose: 1 tablet       Quantity: 14 tablet    Refills: 0       No discharge procedures on file      PDMP Review     None          ED Provider  Electronically Signed by           Tiffanie Yates MD  01/11/23 1101       Tiffanie Yates MD  01/11/23 110

## 2023-01-19 ENCOUNTER — TELEPHONE (OUTPATIENT)
Dept: FAMILY MEDICINE CLINIC | Facility: CLINIC | Age: 73
End: 2023-01-19

## 2023-01-19 DIAGNOSIS — H90.3 SENSORY HEARING LOSS, BILATERAL: Primary | ICD-10-CM

## 2023-01-19 NOTE — TELEPHONE ENCOUNTER
Katelyn Escalante Audiologist called in needing Dr Jackman to submit an order for consult for audiology with a diagnoses code of H90 3  They state this has to be an order by the doctor and not a referral  After submitted they need this faxed over  Fax number is going to be, [phone]

## 2023-01-23 ENCOUNTER — APPOINTMENT (OUTPATIENT)
Dept: LAB | Facility: HOSPITAL | Age: 73
End: 2023-01-23
Attending: OTOLARYNGOLOGY

## 2023-01-23 DIAGNOSIS — H90.3 ASYMMETRIC SNHL (SENSORINEURAL HEARING LOSS): ICD-10-CM

## 2023-01-24 LAB — B BURGDOR IGG+IGM SER-ACNC: <0.2 AI

## 2023-08-03 ENCOUNTER — HOSPITAL ENCOUNTER (OUTPATIENT)
Dept: RADIOLOGY | Facility: HOSPITAL | Age: 73
End: 2023-08-03
Payer: COMMERCIAL

## 2023-08-03 DIAGNOSIS — M54.50 LUMBAR PAIN: ICD-10-CM

## 2023-08-03 PROCEDURE — 72100 X-RAY EXAM L-S SPINE 2/3 VWS: CPT

## 2023-08-25 ENCOUNTER — HOSPITAL ENCOUNTER (OUTPATIENT)
Dept: MRI IMAGING | Facility: HOSPITAL | Age: 73
End: 2023-08-25
Payer: COMMERCIAL

## 2023-08-25 DIAGNOSIS — S32.010A WEDGE COMPRESSION FRACTURE OF FIRST LUMBAR VERTEBRA, INITIAL ENCOUNTER FOR CLOSED FRACTURE (HCC): ICD-10-CM

## 2023-08-25 PROCEDURE — 72148 MRI LUMBAR SPINE W/O DYE: CPT

## 2024-10-08 ENCOUNTER — APPOINTMENT (OUTPATIENT)
Dept: LAB | Facility: HOSPITAL | Age: 74
End: 2024-10-08
Payer: COMMERCIAL

## 2024-10-08 DIAGNOSIS — Z13.1 SCREENING FOR DIABETES MELLITUS: ICD-10-CM

## 2024-10-08 DIAGNOSIS — Z00.00 ROUTINE GENERAL MEDICAL EXAMINATION AT A HEALTH CARE FACILITY: ICD-10-CM

## 2024-10-08 LAB
25(OH)D3 SERPL-MCNC: 27.5 NG/ML (ref 30–100)
ALBUMIN SERPL BCG-MCNC: 3.7 G/DL (ref 3.5–5)
ALP SERPL-CCNC: 64 U/L (ref 34–104)
ALT SERPL W P-5'-P-CCNC: 10 U/L (ref 7–52)
ANION GAP SERPL CALCULATED.3IONS-SCNC: 8 MMOL/L (ref 4–13)
AST SERPL W P-5'-P-CCNC: 14 U/L (ref 13–39)
BACTERIA UR QL AUTO: ABNORMAL /HPF
BASOPHILS # BLD AUTO: 0.07 THOUSANDS/ΜL (ref 0–0.1)
BASOPHILS NFR BLD AUTO: 1 % (ref 0–1)
BILIRUB SERPL-MCNC: 0.66 MG/DL (ref 0.2–1)
BILIRUB UR QL STRIP: NEGATIVE
BUN SERPL-MCNC: 20 MG/DL (ref 5–25)
CALCIUM SERPL-MCNC: 8.9 MG/DL (ref 8.4–10.2)
CHLORIDE SERPL-SCNC: 104 MMOL/L (ref 96–108)
CHOLEST SERPL-MCNC: 208 MG/DL
CLARITY UR: CLEAR
CO2 SERPL-SCNC: 27 MMOL/L (ref 21–32)
COLOR UR: YELLOW
CREAT SERPL-MCNC: 0.78 MG/DL (ref 0.6–1.3)
EOSINOPHIL # BLD AUTO: 0.28 THOUSAND/ΜL (ref 0–0.61)
EOSINOPHIL NFR BLD AUTO: 4 % (ref 0–6)
ERYTHROCYTE [DISTWIDTH] IN BLOOD BY AUTOMATED COUNT: 12.3 % (ref 11.6–15.1)
EST. AVERAGE GLUCOSE BLD GHB EST-MCNC: 105 MG/DL
GFR SERPL CREATININE-BSD FRML MDRD: 88 ML/MIN/1.73SQ M
GLUCOSE P FAST SERPL-MCNC: 90 MG/DL (ref 65–99)
GLUCOSE UR STRIP-MCNC: NEGATIVE MG/DL
HBA1C MFR BLD: 5.3 %
HCT VFR BLD AUTO: 47.6 % (ref 36.5–49.3)
HDLC SERPL-MCNC: 44 MG/DL
HGB BLD-MCNC: 15.4 G/DL (ref 12–17)
HGB UR QL STRIP.AUTO: ABNORMAL
IMM GRANULOCYTES # BLD AUTO: 0.02 THOUSAND/UL (ref 0–0.2)
IMM GRANULOCYTES NFR BLD AUTO: 0 % (ref 0–2)
KETONES UR STRIP-MCNC: NEGATIVE MG/DL
LDLC SERPL CALC-MCNC: 139 MG/DL (ref 0–100)
LEUKOCYTE ESTERASE UR QL STRIP: NEGATIVE
LYMPHOCYTES # BLD AUTO: 1.5 THOUSANDS/ΜL (ref 0.6–4.47)
LYMPHOCYTES NFR BLD AUTO: 22 % (ref 14–44)
MCH RBC QN AUTO: 30.4 PG (ref 26.8–34.3)
MCHC RBC AUTO-ENTMCNC: 32.4 G/DL (ref 31.4–37.4)
MCV RBC AUTO: 94 FL (ref 82–98)
MONOCYTES # BLD AUTO: 0.46 THOUSAND/ΜL (ref 0.17–1.22)
MONOCYTES NFR BLD AUTO: 7 % (ref 4–12)
MUCOUS THREADS UR QL AUTO: ABNORMAL
NEUTROPHILS # BLD AUTO: 4.49 THOUSANDS/ΜL (ref 1.85–7.62)
NEUTS SEG NFR BLD AUTO: 66 % (ref 43–75)
NITRITE UR QL STRIP: NEGATIVE
NON-SQ EPI CELLS URNS QL MICRO: ABNORMAL /HPF
NONHDLC SERPL-MCNC: 164 MG/DL
NRBC BLD AUTO-RTO: 0 /100 WBCS
PH UR STRIP.AUTO: 6 [PH]
PLATELET # BLD AUTO: 209 THOUSANDS/UL (ref 149–390)
PMV BLD AUTO: 10.7 FL (ref 8.9–12.7)
POTASSIUM SERPL-SCNC: 4.1 MMOL/L (ref 3.5–5.3)
PROT SERPL-MCNC: 6.5 G/DL (ref 6.4–8.4)
PROT UR STRIP-MCNC: ABNORMAL MG/DL
PSA SERPL-MCNC: 1.09 NG/ML (ref 0–4)
RBC # BLD AUTO: 5.07 MILLION/UL (ref 3.88–5.62)
RBC #/AREA URNS AUTO: ABNORMAL /HPF
SODIUM SERPL-SCNC: 139 MMOL/L (ref 135–147)
SP GR UR STRIP.AUTO: 1.02 (ref 1–1.03)
T4 FREE SERPL-MCNC: 0.76 NG/DL (ref 0.61–1.12)
TESTOST SERPL-MSCNC: 282 NG/DL
TRIGL SERPL-MCNC: 127 MG/DL
TSH SERPL DL<=0.05 MIU/L-ACNC: 2.33 UIU/ML (ref 0.45–4.5)
UROBILINOGEN UR STRIP-ACNC: <2 MG/DL
VIT B12 SERPL-MCNC: 435 PG/ML (ref 180–914)
WBC # BLD AUTO: 6.82 THOUSAND/UL (ref 4.31–10.16)
WBC #/AREA URNS AUTO: ABNORMAL /HPF

## 2024-10-08 PROCEDURE — 84439 ASSAY OF FREE THYROXINE: CPT

## 2024-10-08 PROCEDURE — G0103 PSA SCREENING: HCPCS

## 2024-10-08 PROCEDURE — 84443 ASSAY THYROID STIM HORMONE: CPT

## 2024-10-08 PROCEDURE — 80061 LIPID PANEL: CPT

## 2024-10-08 PROCEDURE — 36415 COLL VENOUS BLD VENIPUNCTURE: CPT

## 2024-10-08 PROCEDURE — 83036 HEMOGLOBIN GLYCOSYLATED A1C: CPT

## 2024-10-08 PROCEDURE — 82607 VITAMIN B-12: CPT

## 2024-10-08 PROCEDURE — 85025 COMPLETE CBC W/AUTO DIFF WBC: CPT

## 2024-10-08 PROCEDURE — 80053 COMPREHEN METABOLIC PANEL: CPT

## 2024-10-08 PROCEDURE — 81001 URINALYSIS AUTO W/SCOPE: CPT

## 2024-10-08 PROCEDURE — 82306 VITAMIN D 25 HYDROXY: CPT

## 2024-10-08 PROCEDURE — 84403 ASSAY OF TOTAL TESTOSTERONE: CPT

## 2025-03-02 ENCOUNTER — OFFICE VISIT (OUTPATIENT)
Dept: URGENT CARE | Facility: CLINIC | Age: 75
End: 2025-03-02
Payer: MEDICARE

## 2025-03-02 VITALS
RESPIRATION RATE: 18 BRPM | OXYGEN SATURATION: 97 % | HEART RATE: 77 BPM | SYSTOLIC BLOOD PRESSURE: 130 MMHG | TEMPERATURE: 97.8 F | DIASTOLIC BLOOD PRESSURE: 84 MMHG

## 2025-03-02 DIAGNOSIS — K52.9 GASTROENTERITIS: Primary | ICD-10-CM

## 2025-03-02 DIAGNOSIS — J02.9 SORE THROAT: ICD-10-CM

## 2025-03-02 DIAGNOSIS — R53.83 OTHER FATIGUE: ICD-10-CM

## 2025-03-02 LAB — S PYO AG THROAT QL: NEGATIVE

## 2025-03-02 PROCEDURE — 87880 STREP A ASSAY W/OPTIC: CPT | Performed by: PHYSICIAN ASSISTANT

## 2025-03-02 PROCEDURE — 99213 OFFICE O/P EST LOW 20 MIN: CPT | Performed by: PHYSICIAN ASSISTANT

## 2025-03-02 PROCEDURE — G0463 HOSPITAL OUTPT CLINIC VISIT: HCPCS | Performed by: PHYSICIAN ASSISTANT

## 2025-03-02 NOTE — PROGRESS NOTES
"  [facility]        NAME: Ej Clay is a 75 y.o. male  : 1950    MRN: [id number]  DATE: 2025  TIME: 1:44 PM    Assessment and Plan   Gastroenteritis [K52.9]  1. Gastroenteritis        2. Other fatigue  POCT rapid ANTIGEN strepA      3. Sore throat  POCT rapid ANTIGEN strepA            Patient Instructions   Maintain hydration by drinking lots of fluids.  If feeling nauseous, take small steps every 5-10 minutes.  May supplement water with Pedialyte or watered down Gatorade.  Eat as tolerated.  Begin with clear liquids (Jell-O, water ice, broth, popsicles) and progress to BRAT diet (bananas, rice, applesauce and toast).  Advance diet as tolerated.  Use OTC Tylenol for fever.  You should begin to feel better and the next couple days.    Follow-up with your PCP in 3-5 days.  Go to the ER if you are unable to maintain hydration or symptoms become severe.      Follow up with PCP in 3-5 days.  Proceed to  ER if symptoms worsen.    If tests have been performed at [facility], our office will contact you with results if changes need to be made to the care plan discussed with you at the visit.  [facility]    Chief Complaint     Chief Complaint   Patient presents with    Nausea     Pt reports he only feels \"queasy\" at this point.     Fatigue     Pt reports fatigue after suffering from nausea/vomiting/diarrhea Monday through Wednesday. Pt is now tolerating a normal diet and has not had any vomiting since Tuesday. Pt reports other family-friend members had similar symptoms this past week. Pt also reports stool is currently a little lighter than normal.     Sore Throat         History of Present Illness       Patient is a 75-year-old male with significant past medical history of hyperlipidemia presents the office complaining of fatigue for a week.  States he had nausea, vomiting, and diarrhea for 24 hours 6 days ago.  Mild sore throat.  Denies fever.  Denies " congestion, cough, chest pain, shortness of breath, abdominal pain, or rashes.  Eating and drinking normal again.  Concerned that he still very fatigued even though his other symptoms resolved.        Review of Systems   Review of Systems   Constitutional:  Positive for fatigue. Negative for chills and fever.   HENT:  Positive for sore throat. Negative for congestion.    Respiratory:  Negative for cough and shortness of breath.    Cardiovascular:  Negative for chest pain, palpitations and leg swelling.   Gastrointestinal:  Positive for diarrhea, nausea and vomiting. Negative for abdominal pain.   Musculoskeletal:  Negative for myalgias.   Skin:  Negative for rash.   Neurological:  Positive for light-headedness. Negative for dizziness and headaches.         Current Medications       Current Outpatient Medications:     predniSONE 20 mg tablet, 1 tab twice daily for 5 days, then 1 tab daily for 5 days., Disp: 15 tablet, Rfl: 0    Current Allergies     Allergies as of 03/02/2025    (No Known Allergies)            The following portions of the patient's history were reviewed and updated as appropriate: allergies, current medications, past family history, past medical history, past social history, past surgical history and problem list.     Past Medical History:   Diagnosis Date    Back pain        History reviewed. No pertinent surgical history.    Family History   Problem Relation Age of Onset    Ovarian cancer Mother     Thyroid disease Father     Hypertension Father     Heart disease Father     Diabetes Brother     Heart disease Brother     Skin cancer Maternal Grandfather     Cancer Family     Colon cancer Neg Hx     Colon polyps Neg Hx          Medications have been verified.        Objective   /84   Pulse 77   Temp 97.8 °F (36.6 °C) (Tympanic)   Resp 18   SpO2 97%   No LMP for male patient.       Physical Exam     Physical Exam  Vitals and nursing note reviewed.   Constitutional:       Appearance: Normal  appearance. He is well-developed.   HENT:      Head: Normocephalic and atraumatic.      Right Ear: Tympanic membrane, ear canal and external ear normal.      Left Ear: Tympanic membrane, ear canal and external ear normal.      Nose: Nose normal.      Mouth/Throat:      Pharynx: Uvula midline.   Eyes:      General: Lids are normal.      Conjunctiva/sclera: Conjunctivae normal.      Pupils: Pupils are equal, round, and reactive to light.   Cardiovascular:      Rate and Rhythm: Normal rate and regular rhythm.      Heart sounds: Normal heart sounds. No murmur heard.     No friction rub. No gallop.   Pulmonary:      Effort: Pulmonary effort is normal.      Breath sounds: Normal breath sounds. No stridor. No wheezing or rales.   Abdominal:      General: Bowel sounds are normal.      Palpations: Abdomen is soft.      Tenderness: There is no abdominal tenderness.   Musculoskeletal:         General: Normal range of motion.      Cervical back: Neck supple.   Skin:     General: Skin is warm and dry.      Capillary Refill: Capillary refill takes less than 2 seconds.   Neurological:      Mental Status: He is alert.       POC rapid strep negative

## 2025-03-13 ENCOUNTER — APPOINTMENT (OUTPATIENT)
Dept: LAB | Facility: HOSPITAL | Age: 75
End: 2025-03-13
Payer: COMMERCIAL

## 2025-03-13 DIAGNOSIS — R53.83 OTHER FATIGUE: ICD-10-CM

## 2025-03-13 LAB
ALBUMIN SERPL BCG-MCNC: 3.7 G/DL (ref 3.5–5)
ALP SERPL-CCNC: 67 U/L (ref 34–104)
ALT SERPL W P-5'-P-CCNC: 18 U/L (ref 7–52)
ANION GAP SERPL CALCULATED.3IONS-SCNC: 8 MMOL/L (ref 4–13)
AST SERPL W P-5'-P-CCNC: 16 U/L (ref 13–39)
BASOPHILS # BLD AUTO: 0.08 THOUSANDS/ÂΜL (ref 0–0.1)
BASOPHILS NFR BLD AUTO: 1 % (ref 0–1)
BILIRUB SERPL-MCNC: 0.6 MG/DL (ref 0.2–1)
BUN SERPL-MCNC: 24 MG/DL (ref 5–25)
CALCIUM SERPL-MCNC: 9.1 MG/DL (ref 8.4–10.2)
CHLORIDE SERPL-SCNC: 105 MMOL/L (ref 96–108)
CO2 SERPL-SCNC: 28 MMOL/L (ref 21–32)
CREAT SERPL-MCNC: 0.85 MG/DL (ref 0.6–1.3)
EOSINOPHIL # BLD AUTO: 0.29 THOUSAND/ÂΜL (ref 0–0.61)
EOSINOPHIL NFR BLD AUTO: 4 % (ref 0–6)
ERYTHROCYTE [DISTWIDTH] IN BLOOD BY AUTOMATED COUNT: 12.2 % (ref 11.6–15.1)
GFR SERPL CREATININE-BSD FRML MDRD: 85 ML/MIN/1.73SQ M
GLUCOSE SERPL-MCNC: 131 MG/DL (ref 65–140)
HCT VFR BLD AUTO: 48.3 % (ref 36.5–49.3)
HGB BLD-MCNC: 15.6 G/DL (ref 12–17)
IMM GRANULOCYTES # BLD AUTO: 0.02 THOUSAND/UL (ref 0–0.2)
IMM GRANULOCYTES NFR BLD AUTO: 0 % (ref 0–2)
LYMPHOCYTES # BLD AUTO: 1.38 THOUSANDS/ÂΜL (ref 0.6–4.47)
LYMPHOCYTES NFR BLD AUTO: 19 % (ref 14–44)
MCH RBC QN AUTO: 30.2 PG (ref 26.8–34.3)
MCHC RBC AUTO-ENTMCNC: 32.3 G/DL (ref 31.4–37.4)
MCV RBC AUTO: 94 FL (ref 82–98)
MONOCYTES # BLD AUTO: 0.62 THOUSAND/ÂΜL (ref 0.17–1.22)
MONOCYTES NFR BLD AUTO: 8 % (ref 4–12)
NEUTROPHILS # BLD AUTO: 4.99 THOUSANDS/ÂΜL (ref 1.85–7.62)
NEUTS SEG NFR BLD AUTO: 68 % (ref 43–75)
NRBC BLD AUTO-RTO: 0 /100 WBCS
PLATELET # BLD AUTO: 249 THOUSANDS/UL (ref 149–390)
PMV BLD AUTO: 10.4 FL (ref 8.9–12.7)
POTASSIUM SERPL-SCNC: 4.3 MMOL/L (ref 3.5–5.3)
PROT SERPL-MCNC: 6.6 G/DL (ref 6.4–8.4)
RBC # BLD AUTO: 5.16 MILLION/UL (ref 3.88–5.62)
SODIUM SERPL-SCNC: 141 MMOL/L (ref 135–147)
WBC # BLD AUTO: 7.38 THOUSAND/UL (ref 4.31–10.16)

## 2025-03-13 PROCEDURE — 36415 COLL VENOUS BLD VENIPUNCTURE: CPT

## 2025-03-13 PROCEDURE — 85025 COMPLETE CBC W/AUTO DIFF WBC: CPT

## 2025-03-13 PROCEDURE — 80053 COMPREHEN METABOLIC PANEL: CPT

## 2025-03-20 ENCOUNTER — APPOINTMENT (OUTPATIENT)
Dept: LAB | Facility: HOSPITAL | Age: 75
End: 2025-03-20
Payer: MEDICARE

## 2025-03-20 ENCOUNTER — OFFICE VISIT (OUTPATIENT)
Dept: FAMILY MEDICINE CLINIC | Facility: HOSPITAL | Age: 75
End: 2025-03-20
Payer: MEDICARE

## 2025-03-20 VITALS
DIASTOLIC BLOOD PRESSURE: 90 MMHG | SYSTOLIC BLOOD PRESSURE: 140 MMHG | HEART RATE: 85 BPM | BODY MASS INDEX: 36.98 KG/M2 | TEMPERATURE: 98 F | WEIGHT: 273 LBS | OXYGEN SATURATION: 96 % | HEIGHT: 72 IN

## 2025-03-20 DIAGNOSIS — E55.9 VITAMIN D DEFICIENCY: ICD-10-CM

## 2025-03-20 DIAGNOSIS — R19.5 LOOSE STOOLS: Primary | ICD-10-CM

## 2025-03-20 DIAGNOSIS — Z11.59 NEED FOR HEPATITIS C SCREENING TEST: ICD-10-CM

## 2025-03-20 DIAGNOSIS — R53.83 FATIGUE, UNSPECIFIED TYPE: ICD-10-CM

## 2025-03-20 DIAGNOSIS — R19.5 LOOSE STOOLS: ICD-10-CM

## 2025-03-20 LAB
25(OH)D3 SERPL-MCNC: 28.3 NG/ML (ref 30–100)
BASOPHILS # BLD AUTO: 0.06 THOUSANDS/ÂΜL (ref 0–0.1)
BASOPHILS NFR BLD AUTO: 1 % (ref 0–1)
CRP SERPL QL: 5.5 MG/L
EOSINOPHIL # BLD AUTO: 0.39 THOUSAND/ÂΜL (ref 0–0.61)
EOSINOPHIL NFR BLD AUTO: 5 % (ref 0–6)
ERYTHROCYTE [DISTWIDTH] IN BLOOD BY AUTOMATED COUNT: 12.5 % (ref 11.6–15.1)
HCT VFR BLD AUTO: 47.8 % (ref 36.5–49.3)
HGB BLD-MCNC: 15.2 G/DL (ref 12–17)
IMM GRANULOCYTES # BLD AUTO: 0.04 THOUSAND/UL (ref 0–0.2)
IMM GRANULOCYTES NFR BLD AUTO: 1 % (ref 0–2)
LYMPHOCYTES # BLD AUTO: 1.56 THOUSANDS/ÂΜL (ref 0.6–4.47)
LYMPHOCYTES NFR BLD AUTO: 21 % (ref 14–44)
MCH RBC QN AUTO: 30.2 PG (ref 26.8–34.3)
MCHC RBC AUTO-ENTMCNC: 31.8 G/DL (ref 31.4–37.4)
MCV RBC AUTO: 95 FL (ref 82–98)
MONOCYTES # BLD AUTO: 0.53 THOUSAND/ÂΜL (ref 0.17–1.22)
MONOCYTES NFR BLD AUTO: 7 % (ref 4–12)
NEUTROPHILS # BLD AUTO: 4.9 THOUSANDS/ÂΜL (ref 1.85–7.62)
NEUTS SEG NFR BLD AUTO: 65 % (ref 43–75)
NRBC BLD AUTO-RTO: 0 /100 WBCS
PLATELET # BLD AUTO: 231 THOUSANDS/UL (ref 149–390)
PMV BLD AUTO: 10.7 FL (ref 8.9–12.7)
RBC # BLD AUTO: 5.04 MILLION/UL (ref 3.88–5.62)
TSH SERPL DL<=0.05 MIU/L-ACNC: 1.06 UIU/ML (ref 0.45–4.5)
WBC # BLD AUTO: 7.48 THOUSAND/UL (ref 4.31–10.16)

## 2025-03-20 PROCEDURE — 86140 C-REACTIVE PROTEIN: CPT

## 2025-03-20 PROCEDURE — 82306 VITAMIN D 25 HYDROXY: CPT

## 2025-03-20 PROCEDURE — 85025 COMPLETE CBC W/AUTO DIFF WBC: CPT

## 2025-03-20 PROCEDURE — 86803 HEPATITIS C AB TEST: CPT

## 2025-03-20 PROCEDURE — 36415 COLL VENOUS BLD VENIPUNCTURE: CPT

## 2025-03-20 PROCEDURE — G2211 COMPLEX E/M VISIT ADD ON: HCPCS

## 2025-03-20 PROCEDURE — 86231 EMA EACH IG CLASS: CPT

## 2025-03-20 PROCEDURE — 84443 ASSAY THYROID STIM HORMONE: CPT

## 2025-03-20 PROCEDURE — 86618 LYME DISEASE ANTIBODY: CPT

## 2025-03-20 PROCEDURE — 99214 OFFICE O/P EST MOD 30 MIN: CPT

## 2025-03-20 NOTE — PROGRESS NOTES
Name: Ej Clay      : 1950      MRN: [id number]  Encounter Provider: Ricky Cisneros MD  Encounter Date: 3/20/2025   Encounter department: [facility] PRIMARY CARE [address]  :  Assessment & Plan  Loose stools  Patient reports chronic intermittent loose stools without blood, will check for celiac and refer to gastroenterology  Orders:    Ambulatory Referral to Gastroenterology; Future    Endomysial antibody, IgA; Future    Vitamin D deficiency  Noted on lab review, not on a vitamin D supplement, will recheck  Orders:    Vitamin D 25 hydroxy; Future    Fatigue, unspecified type  Patient reports chronic fatigue for over a month since having an episode of gastroenteritis, will workup per orders below  Orders:    CBC and differential; Future    TSH, 3rd generation with Free T4 reflex; Future    Lyme Total AB W Reflex to IGM/IGG; Future    C-reactive protein; Future    Need for hepatitis C screening test    Orders:    Hepatitis C Antibody; Future           History of Present Illness   HPI    Patient presents to establish care.  Gastroenteritis in feb, reports right swollen lymph nodes, fatigue, poor appetite, loose stools      Review of Systems   Constitutional:  Negative for chills and fever.   Respiratory:  Negative for shortness of breath.    Cardiovascular:  Negative for chest pain and palpitations.   Gastrointestinal:  Positive for diarrhea. Negative for abdominal pain, blood in stool, constipation, nausea and vomiting.   Neurological:  Negative for dizziness and headaches.       Objective   /90   Pulse 85   Temp 98 °F (36.7 °C)   Ht 6' (1.829 m)   Wt 124 kg (273 lb)   SpO2 96%   BMI 37.03 kg/m²      Physical Exam  Constitutional:       General: He is not in acute distress.     Appearance: He is obese.   Cardiovascular:      Rate and Rhythm: Normal rate and regular rhythm.      Heart sounds: Normal heart sounds. No murmur heard.  Pulmonary:      Effort: Pulmonary effort is normal.  No respiratory distress.      Breath sounds: Normal breath sounds.   Musculoskeletal:      Cervical back: Neck supple. No tenderness.   Lymphadenopathy:      Cervical: No cervical adenopathy.   Neurological:      Mental Status: He is alert and oriented to person, place, and time.   Psychiatric:         Mood and Affect: Mood normal.         Behavior: Behavior normal.

## 2025-03-21 ENCOUNTER — TELEPHONE (OUTPATIENT)
Age: 75
End: 2025-03-21

## 2025-03-21 LAB
B BURGDOR IGG+IGM SER QL IA: NEGATIVE
ENDOMYSIUM IGA SER QL: NEGATIVE
HCV AB SER QL: NORMAL

## 2025-03-21 NOTE — TELEPHONE ENCOUNTER
Pt called. Noted labs are resulted with abnormality, would like to review with PCP. Informed Pt there are still some pending labs. Will have PCP review and further advise. Pt denies further questions at this time and is agreeable with plan.

## 2025-03-24 ENCOUNTER — RA CDI HCC (OUTPATIENT)
Dept: OTHER | Facility: HOSPITAL | Age: 75
End: 2025-03-24

## 2025-03-24 ENCOUNTER — TELEPHONE (OUTPATIENT)
Age: 75
End: 2025-03-24

## 2025-03-24 ENCOUNTER — TELEPHONE (OUTPATIENT)
Dept: FAMILY MEDICINE CLINIC | Facility: HOSPITAL | Age: 75
End: 2025-03-24

## 2025-03-24 NOTE — TELEPHONE ENCOUNTER
Patient would like to speak to someone regarding his lab results before his 3/28 appt. Please advise.

## 2025-03-24 NOTE — TELEPHONE ENCOUNTER
Pt called asking about his recent labs done, specifically his CRP.  I reviewed with him.  He does have an upcoming appt with you on fri 3/28 for which I said that you would go over in detail at that time.  Pt wants to know--#1--what are the normal ranges for CRP and HS CRP?   Wants clarification.    #2--wants to know what next step is since it was out of range.  I told him that you would review at ov, but he was persistent and wanted an answer sooner.    I tried to explain to him about ranges, but he talked over me and said that he has 3 college degrees and you will ask the doctor what questions I want answered.  I told him that I am trying to help him and to please not talk over me.  It didn't seem to make a difference.    Please clarify so I can call him tmr.  I told him that you were out till tmr.  Thx.

## 2025-03-25 NOTE — TELEPHONE ENCOUNTER
Mess to pt.   He did go on about the abnormal result on the CRP, but then said--he gets bad information from [facility].  He said he will just have to wait until Fridays appt to go over.  He then said he had to go as he had somewhere to go.   ere to go.

## 2025-03-28 ENCOUNTER — OFFICE VISIT (OUTPATIENT)
Dept: FAMILY MEDICINE CLINIC | Facility: HOSPITAL | Age: 75
End: 2025-03-28
Payer: MEDICARE

## 2025-03-28 ENCOUNTER — OFFICE VISIT (OUTPATIENT)
Dept: GASTROENTEROLOGY | Facility: CLINIC | Age: 75
End: 2025-03-28
Payer: MEDICARE

## 2025-03-28 VITALS
BODY MASS INDEX: 36.6 KG/M2 | WEIGHT: 270.2 LBS | SYSTOLIC BLOOD PRESSURE: 141 MMHG | HEIGHT: 72 IN | DIASTOLIC BLOOD PRESSURE: 67 MMHG

## 2025-03-28 VITALS
HEIGHT: 72 IN | HEART RATE: 81 BPM | DIASTOLIC BLOOD PRESSURE: 84 MMHG | OXYGEN SATURATION: 97 % | SYSTOLIC BLOOD PRESSURE: 120 MMHG | WEIGHT: 269.4 LBS | BODY MASS INDEX: 36.49 KG/M2

## 2025-03-28 DIAGNOSIS — E78.5 DYSLIPIDEMIA: ICD-10-CM

## 2025-03-28 DIAGNOSIS — Z12.11 SCREENING FOR COLON CANCER: Primary | ICD-10-CM

## 2025-03-28 DIAGNOSIS — R19.5 LOOSE STOOLS: ICD-10-CM

## 2025-03-28 DIAGNOSIS — R53.83 FATIGUE, UNSPECIFIED TYPE: Primary | ICD-10-CM

## 2025-03-28 DIAGNOSIS — E55.9 VITAMIN D DEFICIENCY: ICD-10-CM

## 2025-03-28 DIAGNOSIS — R79.82 ELEVATED C-REACTIVE PROTEIN (CRP): ICD-10-CM

## 2025-03-28 PROCEDURE — G2211 COMPLEX E/M VISIT ADD ON: HCPCS

## 2025-03-28 PROCEDURE — 99203 OFFICE O/P NEW LOW 30 MIN: CPT | Performed by: INTERNAL MEDICINE

## 2025-03-28 PROCEDURE — 99214 OFFICE O/P EST MOD 30 MIN: CPT

## 2025-03-28 PROCEDURE — G2211 COMPLEX E/M VISIT ADD ON: HCPCS | Performed by: INTERNAL MEDICINE

## 2025-03-28 NOTE — PATIENT INSTRUCTIONS
Continue probiotics.  Start to advance diet.  If not back to baseline in 3 to 4 weeks let me know.  Will consider colonoscopy at that point.  If diarrhea gets worse/consistent let me know and we will order stool studies

## 2025-03-28 NOTE — PROGRESS NOTES
Name: Ej Clay      : 1950      MRN: [id number]  Encounter Provider: Ricky Cisneros MD  Encounter Date: 3/28/2025   Encounter department: [facility] PRIMARY CARE [address]  :  Assessment & Plan  Fatigue, unspecified type  Continued fatigue since GI illness over a month ago, patient scheduled to follow-up with gastroenterology today, will defer further evaluation and treatment to them       Vitamin D deficiency  Noted on lab review, will start vitamin D supplementation and recheck vitamin D in 3 months  Orders:    Cholecalciferol (VITAMIN D3) 1,000 units tablet; Take 1 tablet (1,000 Units total) by mouth daily    Vitamin D 25 hydroxy; Future    Elevated C-reactive protein (CRP)  CRP level slightly elevated, etiology unclear, will recheck  Orders:    C-reactive protein; Future    Dyslipidemia  ASCVD risk of 29.8%, high risk, recommended statin therapy, patient insisting to recheck lipid panel before starting therapy, order placed  Orders:    Lipid Panel with Direct LDL reflex; Future    Return for annual wellness visit       History of Present Illness   HPI  fatigue improving,     Review of Systems   Constitutional:  Positive for fatigue. Negative for chills and fever.   Respiratory:  Negative for shortness of breath.    Cardiovascular:  Negative for chest pain and palpitations.   Neurological:  Negative for light-headedness.       Objective   /84   Pulse 81   Ht 6' (1.829 m)   Wt 122 kg (269 lb 6.4 oz)   SpO2 97%   BMI 36.54 kg/m²      Physical Exam  Constitutional:       General: He is not in acute distress.     Appearance: Normal appearance. He is obese.   Neurological:      Mental Status: He is alert and oriented to person, place, and time.   Psychiatric:         Mood and Affect: Mood normal.         Behavior: Behavior normal.

## 2025-03-28 NOTE — PROGRESS NOTES
Name: Ej Clay      : 1950      MRN: [id number]  Encounter Provider: George Werner MD  Encounter Date: 3/28/2025   Encounter department: [facility] [place]  :  Assessment & Plan  Loose stools  Loose stools following probable infectious gastroenteritis in 2025.  Seems to be slowly getting better.  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 male who presents ration of change in bowel habits.  Patient reports that his baseline is that he has 2-3 formed bowel movements a day.  Middle of February after getting a frosty at [employer] developed severe nausea/vomiting and diarrhea.  He denied any GI bleed.  He denied any fevers or chills.  This lasted for several days.  Unfortunately since then he has not been back to baseline.  He reports to a significant mount of brain fog.  He reports that he is not having normal bowel movements and is passing significant amount of flatus and gas.  He has been taking probiotics consistently.  His weight is relatively stable.  He denies any nausea or vomiting but reports that he does not have much of an appetite.  He denies any previous GI problems.  Blood work revealed a minimally increased C-reactive protein but otherwise was normal  Diarrhea   Associated symptoms include vomiting. Pertinent negatives include no abdominal pain, arthralgias, chills, coughing or fever.   Nausea  Associated symptoms include nausea and vomiting. Pertinent negatives include no abdominal pain, arthralgias, chest pain, chills,  coughing, fever, rash or sore throat.   Vomiting   Associated symptoms include diarrhea. Pertinent negatives include no abdominal pain, arthralgias, chest pain, chills, coughing or fever.     History obtained from: patient  Review of Systems   Constitutional:  Negative for chills and fever.   HENT:  Negative for ear pain and sore throat.    Eyes:  Negative for pain and visual disturbance.   Respiratory:  Negative for cough and shortness of breath.    Cardiovascular:  Negative for chest pain and palpitations.   Gastrointestinal:  Positive for diarrhea, nausea and vomiting. Negative for abdominal pain.   Genitourinary:  Negative for dysuria and hematuria.   Musculoskeletal:  Negative for arthralgias and back pain.   Skin:  Negative for color change and rash.   Neurological:  Negative for seizures and syncope.   All other systems reviewed and are negative.   A complete review of systems is negative other than that noted above in the HPI.    Past Medical History   Past Medical History:   Diagnosis Date    Back pain      Past Surgical History:   Procedure Laterality Date    COLONOSCOPY       Family History   Problem Relation Age of Onset    Ovarian cancer Mother     Thyroid disease Father     Hypertension Father     Heart disease Father     Diabetes Brother     Heart disease Brother     Skin cancer Maternal Grandfather     Cancer Family     Colon cancer Neg Hx     Colon polyps Neg Hx       reports that he has never smoked. He has never used smokeless tobacco. He reports that he does not drink alcohol and does not use drugs.  Current Outpatient Medications   Medication Instructions    Cholecalciferol (VITAMIN D3) 1,000 Units, Oral, Daily   No Known Allergies   Current Outpatient Medications   Medication Sig Dispense Refill    Cholecalciferol (VITAMIN D3) 1,000 units tablet Take 1 tablet (1,000 Units total) by mouth daily 90 tablet 1     No current facility-administered medications for this visit.     Objective   /67    Ht 6' (1.829 m)   Wt 123 kg (270 lb 3.2 oz)   BMI 36.65 kg/m²     Physical Exam   General appearance: alert, appears stated age and cooperative  Eyes: PERLLA, EOMI, no icterus   Head: Normocephalic, without obvious abnormality, atraumatic  Lungs: clear to auscultation bilaterally  Heart: regular rate and rhythm, S1, S2 normal, no murmur, click, rub or gallop  Abdomen: soft, non-tender; bowel sounds normal; no masses,  no organomegaly  Extremities: extremities normal, atraumatic, no cyanosis or edema  Neurologic: Grossly normal        Lab Results: I personally reviewed relevant lab results. CBC/BMP: No new results in last 24 hours. , LFTs: No new results in last 24 hours.

## 2025-04-08 ENCOUNTER — PATIENT MESSAGE (OUTPATIENT)
Dept: GASTROENTEROLOGY | Facility: CLINIC | Age: 75
End: 2025-04-08

## 2025-04-14 ENCOUNTER — TELEPHONE (OUTPATIENT)
Age: 75
End: 2025-04-14

## 2025-04-14 NOTE — TELEPHONE ENCOUNTER
Left message asking for a call back if he is having specific symptoms. He is not due for cologuard or colonoscopy at this time.

## 2025-04-14 NOTE — TELEPHONE ENCOUNTER
Patient is calling to request a script to have a colon cancer screening, he states that his company insurance pays for it to be done at [facility]    Please review and advise

## 2025-04-25 NOTE — TELEPHONE ENCOUNTER
Patient called back, tried to relay message from Dr. March but patient then asked to talk to someone else in the office when I said cologuard kits are usually done every 3 years if results are normal.  Patient said his insurance will pay for cologuard yearly and wants to have cologuard ordered for him to complete, he said his insurance will cover it.     Thank you

## 2025-04-27 PROBLEM — Z12.11 SCREENING FOR COLON CANCER: Status: RESOLVED | Noted: 2025-03-28 | Resolved: 2025-04-27

## 2025-04-30 ENCOUNTER — TELEPHONE (OUTPATIENT)
Age: 75
End: 2025-04-30

## 2025-04-30 DIAGNOSIS — Z01.84 IMMUNITY STATUS TESTING: Primary | ICD-10-CM

## 2025-04-30 NOTE — TELEPHONE ENCOUNTER
Patient is requesting lab order to check measles titer, uses [facility] labs.

## 2025-05-14 ENCOUNTER — OFFICE VISIT (OUTPATIENT)
Dept: FAMILY MEDICINE CLINIC | Facility: HOSPITAL | Age: 75
End: 2025-05-14
Payer: MEDICARE

## 2025-05-14 VITALS
SYSTOLIC BLOOD PRESSURE: 116 MMHG | OXYGEN SATURATION: 98 % | WEIGHT: 272.8 LBS | DIASTOLIC BLOOD PRESSURE: 78 MMHG | BODY MASS INDEX: 36.95 KG/M2 | TEMPERATURE: 97.7 F | HEIGHT: 72 IN | HEART RATE: 73 BPM

## 2025-05-14 DIAGNOSIS — B35.1 ONYCHOMYCOSIS: ICD-10-CM

## 2025-05-14 DIAGNOSIS — Z01.84 IMMUNITY STATUS TESTING: ICD-10-CM

## 2025-05-14 DIAGNOSIS — R60.0 LOWER LEG EDEMA: Primary | ICD-10-CM

## 2025-05-14 PROCEDURE — G2211 COMPLEX E/M VISIT ADD ON: HCPCS

## 2025-05-14 PROCEDURE — 99214 OFFICE O/P EST MOD 30 MIN: CPT

## 2025-05-14 NOTE — PROGRESS NOTES
Name: Ej Clay      : 1950      MRN: [id number]  Encounter Provider: Ricky Cisneros MD  Encounter Date: 2025   Encounter department: [facility] PRIMARY CARE [address]  :  Assessment & Plan  Lower leg edema  Bilateral pitting edema, stress echo from  normal.  Denies shortness of breath, chest pain, palpitations or dizziness.  Patient is able to exercise without symptoms.  Will obtain updated echo.  Erythematous discoloration on left foot and lower leg without tenderness.  Denies any injury to that leg.  Will obtain venous duplex.  Advised to go to the emergency room if he develops significant leg pain and/or shortness of breath, discussed risks of a clot and PE  Orders:    Echo complete w/ contrast if indicated; Future    VAS VENOUS DUPLEX -LOWER LIMB UNILATERAL; Future    Immunity status testing  Patient requesting measles immunity testing  Orders:    Measles Antibodies (IgG,IgM); Future    Onychomycosis  Diffuse, bilateral, oral terbinafine offered but declined at this time due to patient concern for hepatotoxicity.  Explained that recent LFTs were normal and that we could monitor them while on treatment.              History of Present Illness   HPI  Patient presents with concern for swelling and discoloration in his lower left leg.  He denies any leg pain, fever, chills or shortness of breath.  He denies any exercise intolerance.  Patient reports recently being treated by the dentist for an abscess which is since resolved    Review of Systems   Constitutional:  Negative for chills and fever.   Cardiovascular:  Positive for leg swelling (bilateral).   Skin:  Positive for color change (left lower leg, erythematous). Negative for wound.   Neurological:  Negative for weakness and numbness.       Objective   /78   Pulse 73   Temp 97.7 °F (36.5 °C)   Ht 6' (1.829 m)   Wt 124 kg (272 lb 12.8 oz)   SpO2 98%   BMI 37.00 kg/m²      Physical Exam  Constitutional:       General:  He is not in acute distress.     Appearance: Normal appearance.     Cardiovascular:      Rate and Rhythm: Normal rate and regular rhythm.      Heart sounds: Normal heart sounds. No murmur heard.  Pulmonary:      Effort: Pulmonary effort is normal. No respiratory distress.      Breath sounds: Normal breath sounds.     Musculoskeletal:         General: No tenderness.      Right lower leg: Edema present.      Left lower leg: Edema present.      Comments: Bilateral pitting edema, left more than right, no tenderness.  Normal cap refill and sensation bilaterally.  Erythema in left lower leg.  No wounds or break in skin.  Bilateral toenail fungus     Skin:     Capillary Refill: Capillary refill takes less than 2 seconds.      Findings: Erythema (left lower leg) present.     Neurological:      Mental Status: He is alert and oriented to person, place, and time.     Psychiatric:         Mood and Affect: Mood normal.         Behavior: Behavior normal.

## 2025-05-15 ENCOUNTER — TELEPHONE (OUTPATIENT)
Age: 75
End: 2025-05-15

## 2025-05-15 NOTE — TELEPHONE ENCOUNTER
Pt called central scheduling for a US of lower leg and they need to know if its urgent or not. Please advise

## 2025-05-16 ENCOUNTER — TELEPHONE (OUTPATIENT)
Dept: FAMILY MEDICINE CLINIC | Facility: HOSPITAL | Age: 75
End: 2025-05-16

## 2025-05-16 ENCOUNTER — HOSPITAL ENCOUNTER (OUTPATIENT)
Dept: NON INVASIVE DIAGNOSTICS | Facility: HOSPITAL | Age: 75
Discharge: HOME | End: 2025-05-16
Payer: COMMERCIAL

## 2025-05-16 DIAGNOSIS — R60.0 LOWER LEG EDEMA: ICD-10-CM

## 2025-05-16 DIAGNOSIS — I82.432 ACUTE DEEP VEIN THROMBOSIS (DVT) OF POPLITEAL VEIN OF LEFT LOWER EXTREMITY (HCC): Primary | ICD-10-CM

## 2025-05-16 PROCEDURE — 93971 EXTREMITY STUDY: CPT | Performed by: SURGERY

## 2025-05-16 PROCEDURE — 93971 EXTREMITY STUDY: CPT

## 2025-05-16 NOTE — TELEPHONE ENCOUNTER
Pt cb with follow up questions regarding the blockage in his legs. Pt would like a call back from Dr. Cisneros. Pt stated he will schedule appt when he hears back from

## 2025-05-16 NOTE — PROGRESS NOTES
E-Consult unable to be completed.  We will send a message to our scheduling team for patient to be seen in person.

## 2025-05-16 NOTE — TELEPHONE ENCOUNTER
"Addendum: Notified patient that I discussed case with vascular surgery and they recommend anticoagulation for 6 months and follow-up with hematology given that this was unprovoked DVT.  Hematology consult placed.    Called patient in regards to the following message:     \"Ej Castaneda has evidence of acute vs subacute non-occlusive DVT in the left distal femoral, soleal and gastrocnemius veins and acute occlusive in the popliteal veins. I still have him here, please let me know what you would like me to do. Thanks \"    Patient denies any shortness of breath but still reports mild swelling and discoloration in left lower leg, no pain.  Advised that we will treat with 90 days of anticoagulation with Eliquis, prescription sent to [facility].  Advised to inform us immediately if medication is unaffordable or if he develops side effects of abnormal bleeding or bruising.  Counseled on risks of complication including PE or compromise circulation and extremity that would warrant emergency care.  Advised not to stay inactive, but not to engage in any rigorous exercise that may exacerbate his pain until I consult with vascular surgery.  Plan to review case with on-call vascular surgeon once I have official read to determine any further treatment steps.  "

## 2025-05-19 ENCOUNTER — TELEPHONE (OUTPATIENT)
Age: 75
End: 2025-05-19

## 2025-05-20 ENCOUNTER — TELEPHONE (OUTPATIENT)
Age: 75
End: 2025-05-20

## 2025-05-20 NOTE — TELEPHONE ENCOUNTER
Patient is scheduled 6/23 to see Dr Delcid, per his diagnoses he needs to be seen sooner than June, can someone please assist me with a sooner appointment.  Patient only wants to see a doctor at either [facility] or [place].

## 2025-05-20 NOTE — TELEPHONE ENCOUNTER
Called patient and left a message that a sooner appt has been scheduled for [facility] with Dr Delcid on 6/3 at 120 pm.  Left [employer] if date or time does not work for patient.

## 2025-05-23 ENCOUNTER — TELEPHONE (OUTPATIENT)
Age: 75
End: 2025-05-23

## 2025-05-23 NOTE — TELEPHONE ENCOUNTER
[facility] would [facility] know [facility] and [facility] since being diagnosed with DVT.  He would like to know if there are any restrictions with being on blood thinners for blood clots in his left leg.  Please advise.

## 2025-05-28 ENCOUNTER — TELEPHONE (OUTPATIENT)
Age: 75
End: 2025-05-28

## 2025-05-28 NOTE — TELEPHONE ENCOUNTER
Patient called to ask if Dr. Trent had any objections to patients dentist putting him on an antibiotic. Patient said he has a localized perio infection and his dentist would like to put him on amoxicillin he thinks. Patient stated he was on blood thinners, Eliquis, and he wasn't sure if this would be okay.     Please call patient back with further advisement.

## 2025-06-03 ENCOUNTER — OFFICE VISIT (OUTPATIENT)
Age: 75
End: 2025-06-03
Payer: MEDICARE

## 2025-06-03 VITALS
SYSTOLIC BLOOD PRESSURE: 146 MMHG | HEART RATE: 80 BPM | RESPIRATION RATE: 16 BRPM | OXYGEN SATURATION: 97 % | WEIGHT: 269 LBS | HEIGHT: 72 IN | DIASTOLIC BLOOD PRESSURE: 84 MMHG | TEMPERATURE: 97.9 F | BODY MASS INDEX: 36.44 KG/M2

## 2025-06-03 DIAGNOSIS — I82.432 ACUTE DEEP VEIN THROMBOSIS (DVT) OF POPLITEAL VEIN OF LEFT LOWER EXTREMITY (HCC): Primary | ICD-10-CM

## 2025-06-03 PROCEDURE — G2211 COMPLEX E/M VISIT ADD ON: HCPCS | Performed by: INTERNAL MEDICINE

## 2025-06-03 PROCEDURE — 99205 OFFICE O/P NEW HI 60 MIN: CPT | Performed by: INTERNAL MEDICINE

## 2025-06-03 NOTE — PROGRESS NOTES
Name: Ej Clay      : 1950      MRN: [id number]  Encounter Provider: Keely Delcid MD  Encounter Date: 6/3/2025   Encounter department: [facility]  :  Assessment & Plan  Acute deep vein thrombosis (DVT) of popliteal vein of left lower extremity (HCC)  This is a 75-year-old gentleman with a new diagnosis of DVT in mid May 2025.  Possible provoking factor could be the periodontal abscess, which was associated with a lot of systemic symptoms at that time. An acute inflammatory state could have lead to the DVT.     Given first time DVT and no family hx, I do not think a thrombosis work up is necessary.   If he would like to move forward with testing, I would recommend NOT checking for protein C, S and antithrombin III deficiencies while on Eliquis. This also applies for Lupus anticoagulant. These tests may be falsely positive on anticoagulation.     Given extent of clot, I agree with a total of 6 mo of anticoagulation, which will bring us to mid Nov. I will plan to see him back before that.  Compliance anticoagulation was strongly recommended.    He has no other focal symptoms to suggest malignancy.  I will recommend following up on his previous colonoscopy to see if he is overdue for a repeat one.  (Report not available to me). If so, I would recommend a repeat colonoscopy.  Despite his age, he is otherwise healthy with longevity in his family.     The patient had many questions that were answered to his apparent satisfaction.  Thank you for the consultation.  Please do not hesitate to reach out with any questions or concerns.        Return in about 5 months (around 11/3/2025) for Office Visit.    History of Present Illness   Chief Complaint   Patient presents with    Consult       This is a 75-year-old gentleman with medical history of hyperlipidemia, vitamin D deficiency, obesity, never smoker who has been referred for evaluation of DVT.    Patient  states that in February 2025, he developed gastroenteritis, and had a lot of GI symptoms including diarrhea, bloating, abdominal discomfort.  He believes he may have had COVID during that time cold but was not officially tested    He was evaluated by Dr. Whalen from GI on March 20, 2025 and a repeat colonoscopy was recommended if symptoms did not improve.  He states that by late March/early April he was diagnosed to have a periodontal abscess, requiring antibiotics and drainage.  Apparently, he felt significantly better after treating this.  His systemic symptoms resolved.    [facility]  He does not recall whether a repeat colonoscopy was recommended/all polyps were removed.    He was seen by his primary care provider on May 14, 2025 for bilateral leg edema.  Duplexes were ordered and performed on May 16, 2025 which showed no evidence of thrombus on the right side.  On the left side, there is acute versus subacute nonocclusive DVT in the distal femoral, gastrocnemius and soleal veins with an acute occlusive DVT in the proximal popliteal vein.  Patient was started on Eliquis with recommendation to complete 6 months of therapy. A complete thrombosis panel was requested.    CBC is normal.  Mother passed from ovarian CA in her 80s  Maternal grandfather had skin cancer    Patient otherwise has no focal concerning symptoms.    Pertinent Medical History   06/03/25: Continues to have left leg edema.     Review of Systems   Cardiovascular:  Positive for leg swelling (left leg).   All other systems reviewed and are negative.    Medical History Reviewed by provider this encounter:     .      Objective   /84 (BP Location: Left arm, Patient Position: Sitting, Cuff Size: Adult)   Pulse 80   Temp 97.9 °F (36.6 °C) (Temporal)   Resp 16   Ht 6' (1.829 m)   Wt 122 kg (269 lb)   SpO2 97%   BMI 36.48 kg/m²     Physical Exam  Vitals reviewed.   Constitutional:        Appearance: Normal appearance.     Cardiovascular:      Rate and Rhythm: Normal rate and regular rhythm.      Heart sounds: Normal heart sounds. No murmur heard.  Pulmonary:      Effort: Pulmonary effort is normal. No respiratory distress.      Breath sounds: Normal breath sounds.   Abdominal:      General: There is no distension.      Palpations: Abdomen is soft.      Tenderness: There is no abdominal tenderness.     Musculoskeletal:         General: Swelling (Left leg) present.   Lymphadenopathy:      Cervical: No cervical adenopathy.     Skin:     General: Skin is warm.      Coloration: Skin is not pale.     Neurological:      General: No focal deficit present.      Mental Status: He is alert and oriented to person, place, and time.           Labs: I have reviewed the following labs:  Results for orders placed or performed in visit on 03/20/25   Hepatitis C Antibody   Result Value Ref Range    Hepatitis C Ab Non-reactive Non-Reactive   CBC and differential   Result Value Ref Range    WBC 7.48 4.31 - 10.16 Thousand/uL    RBC 5.04 3.88 - 5.62 Million/uL    Hemoglobin 15.2 12.0 - 17.0 g/dL    Hematocrit 47.8 36.5 - 49.3 %    MCV 95 82 - 98 fL    MCH 30.2 26.8 - 34.3 pg    MCHC 31.8 31.4 - 37.4 g/dL    RDW 12.5 11.6 - 15.1 %    MPV 10.7 8.9 - 12.7 fL    Platelets 231 149 - 390 Thousands/uL    nRBC 0 /100 WBCs    Segmented % 65 43 - 75 %    Immature Grans % 1 0 - 2 %    Lymphocytes % 21 14 - 44 %    Monocytes % 7 4 - 12 %    Eosinophils Relative 5 0 - 6 %    Basophils Relative 1 0 - 1 %    Absolute Neutrophils 4.90 1.85 - 7.62 Thousands/µL    Absolute Immature Grans 0.04 0.00 - 0.20 Thousand/uL    Absolute Lymphocytes 1.56 0.60 - 4.47 Thousands/µL    Absolute Monocytes 0.53 0.17 - 1.22 Thousand/µL    Eosinophils Absolute 0.39 0.00 - 0.61 Thousand/µL    Basophils Absolute 0.06 0.00 - 0.10 Thousands/µL   TSH, 3rd generation with Free T4 reflex   Result Value Ref Range    TSH 3RD GENERATON 1.065 0.450 - 4.500 uIU/mL    Vitamin D 25 hydroxy   Result Value Ref Range    Vit D, 25-Hydroxy 28.3 (L) 30.0 - 100.0 ng/mL   Endomysial antibody, IgA   Result Value Ref Range    Endomysial IgA Negative Negative   Result Value Ref Range    CRP 5.5 (H) <3.0 mg/L   Lyme Total AB W Reflex to IGM/IGG   Result Value Ref Range    Lyme Total Antibodies Negative Negative     Results for orders placed or performed in visit on 03/20/25   Hepatitis C Antibody   Result Value Ref Range    Hepatitis C Ab Non-reactive Non-Reactive   CBC and differential   Result Value Ref Range    WBC 7.48 4.31 - 10.16 Thousand/uL    RBC 5.04 3.88 - 5.62 Million/uL    Hemoglobin 15.2 12.0 - 17.0 g/dL    Hematocrit 47.8 36.5 - 49.3 %    MCV 95 82 - 98 fL    MCH 30.2 26.8 - 34.3 pg    MCHC 31.8 31.4 - 37.4 g/dL    RDW 12.5 11.6 - 15.1 %    MPV 10.7 8.9 - 12.7 fL    Platelets 231 149 - 390 Thousands/uL    nRBC 0 /100 WBCs    Segmented % 65 43 - 75 %    Immature Grans % 1 0 - 2 %    Lymphocytes % 21 14 - 44 %    Monocytes % 7 4 - 12 %    Eosinophils Relative 5 0 - 6 %    Basophils Relative 1 0 - 1 %    Absolute Neutrophils 4.90 1.85 - 7.62 Thousands/µL    Absolute Immature Grans 0.04 0.00 - 0.20 Thousand/uL    Absolute Lymphocytes 1.56 0.60 - 4.47 Thousands/µL    Absolute Monocytes 0.53 0.17 - 1.22 Thousand/µL    Eosinophils Absolute 0.39 0.00 - 0.61 Thousand/µL    Basophils Absolute 0.06 0.00 - 0.10 Thousands/µL   TSH, 3rd generation with Free T4 reflex   Result Value Ref Range    TSH 3RD GENERATON 1.065 0.450 - 4.500 uIU/mL   Vitamin D 25 hydroxy   Result Value Ref Range    Vit D, 25-Hydroxy 28.3 (L) 30.0 - 100.0 ng/mL   Endomysial antibody, IgA   Result Value Ref Range    Endomysial IgA Negative Negative   Result Value Ref Range    CRP 5.5 (H) <3.0 mg/L   Lyme Total AB W Reflex to IGM/IGG   Result Value Ref Range    Lyme Total Antibodies Negative Negative        Radiology Results Review : No pertinent imaging studies reviewed.    Administrative Statements   I have spent a  total time of 55 minutes in caring for this patient on the day of the visit/encounter including Diagnostic results, Prognosis, Risks and benefits of tx options, Patient and family education, Importance of tx compliance, Risk factor reductions, Impressions, Counseling / Coordination of care, Documenting in the medical record, Reviewing/placing orders in the medical record (including tests, medications, and/or procedures), and Obtaining or reviewing history  .

## 2025-06-05 ENCOUNTER — TELEPHONE (OUTPATIENT)
Age: 75
End: 2025-06-05

## 2025-06-05 ENCOUNTER — PATIENT MESSAGE (OUTPATIENT)
Age: 75
End: 2025-06-05

## 2025-06-05 NOTE — TELEPHONE ENCOUNTER
Pt called to reschedule a no show AWV, nothing was coming up until next year, he is asking if someone can call I'm back to reschedule.

## 2025-06-05 NOTE — TELEPHONE ENCOUNTER
Pt is requesting a more detailed document uploaded to his MyChart of Dr. Delcid consultation (6/3/25). He is looking for information about the discussion they had, dx, and plan of care. Moving forward he stated he wants documentation like this always uploaded in his MyChart. The after summary visit documents doesn't have the information he is looking for. Pt was frustrated with it and doesn't want this to occur again. Pt can be reached at [phone]. Thank you.

## 2025-06-06 ENCOUNTER — HOSPITAL ENCOUNTER (OUTPATIENT)
Dept: NON INVASIVE DIAGNOSTICS | Facility: HOSPITAL | Age: 75
Discharge: HOME | End: 2025-06-06
Payer: COMMERCIAL

## 2025-06-06 VITALS
SYSTOLIC BLOOD PRESSURE: 146 MMHG | DIASTOLIC BLOOD PRESSURE: 84 MMHG | WEIGHT: 269 LBS | HEART RATE: 76 BPM | BODY MASS INDEX: 36.48 KG/M2

## 2025-06-06 DIAGNOSIS — R60.0 LOWER LEG EDEMA: ICD-10-CM

## 2025-06-06 LAB
AORTIC ROOT: 3.5 CM
ASCENDING AORTA: 3.2 CM
E WAVE DECELERATION TIME: 194 MS
E/A RATIO: 0.85
FRACTIONAL SHORTENING: 40 (ref 28–44)
INTERVENTRICULAR SEPTUM IN DIASTOLE (PARASTERNAL SHORT AXIS VIEW): 1 CM
INTERVENTRICULAR SEPTUM: 1 CM (ref 0.6–1.1)
LAAS-AP2: 15.5 CM2
LAAS-AP4: 22.4 CM2
LEFT ATRIUM SIZE: 4.2 CM
LEFT ATRIUM VOLUME (MOD BIPLANE): 59 ML
LEFT ATRIUM VOLUME INDEX (MOD BIPLANE): 24.4 ML/M2
LEFT INTERNAL DIMENSION IN SYSTOLE: 2.8 CM (ref 2.1–4)
LEFT VENTRICLE DIASTOLIC VOLUME (MOD BIPLANE): 140 ML
LEFT VENTRICLE SYSTOLIC VOLUME (MOD BIPLANE): 58 ML
LEFT VENTRICULAR INTERNAL DIMENSION IN DIASTOLE: 4.7 CM (ref 3.5–6)
LEFT VENTRICULAR POSTERIOR WALL IN END DIASTOLE: 0.9 CM
LEFT VENTRICULAR STROKE VOLUME: 70 ML
LV EF BIPLANE MOD: 59 %
LV EF US.2D.A4C+ESTIMATED: 54 %
LVSV (TEICH): 70 ML
MV E'TISSUE VEL-LAT: 11 CM/S
MV E'TISSUE VEL-SEP: 9 CM/S
MV PEAK A VEL: 0.79 M/S
MV PEAK E VEL: 67 CM/S
MV STENOSIS PRESSURE HALF TIME: 56 MS
MV VALVE AREA P 1/2 METHOD: 3.93
RA PRESSURE ESTIMATED: 3 MMHG
RIGHT ATRIUM AREA SYSTOLE A4C: 14.3 CM2
RIGHT VENTRICLE ID DIMENSION: 3.8 CM
RV PSP: 27 MMHG
SL CV LEFT ATRIUM LENGTH A2C: 4.8 CM
SL CV LV EF: 65
SL CV PED ECHO LEFT VENTRICLE DIASTOLIC VOLUME (MOD BIPLANE) 2D: 101 ML
SL CV PED ECHO LEFT VENTRICLE SYSTOLIC VOLUME (MOD BIPLANE) 2D: 31 ML
TR MAX PG: 24 MMHG
TR PEAK VELOCITY: 2.4 M/S
TRICUSPID ANNULAR PLANE SYSTOLIC EXCURSION: 2.6 CM
TRICUSPID VALVE PEAK REGURGITATION VELOCITY: 2.43 M/S

## 2025-06-06 PROCEDURE — 93306 TTE W/DOPPLER COMPLETE: CPT

## 2025-06-06 PROCEDURE — 93306 TTE W/DOPPLER COMPLETE: CPT | Performed by: INTERNAL MEDICINE

## 2025-06-09 ENCOUNTER — TELEPHONE (OUTPATIENT)
Age: 75
End: 2025-06-09

## 2025-06-13 ENCOUNTER — TELEPHONE (OUTPATIENT)
Dept: FAMILY MEDICINE CLINIC | Facility: HOSPITAL | Age: 75
End: 2025-06-13

## 2025-06-13 NOTE — TELEPHONE ENCOUNTER
Left message for patient to call back. Need to reschedule is [facility] appointment. Provider leaving early that day.

## 2025-06-27 ENCOUNTER — OFFICE VISIT (OUTPATIENT)
Dept: FAMILY MEDICINE CLINIC | Facility: HOSPITAL | Age: 75
End: 2025-06-27
Payer: COMMERCIAL

## 2025-06-27 VITALS
HEART RATE: 78 BPM | SYSTOLIC BLOOD PRESSURE: 122 MMHG | BODY MASS INDEX: 36.54 KG/M2 | DIASTOLIC BLOOD PRESSURE: 80 MMHG | HEIGHT: 72 IN | OXYGEN SATURATION: 97 % | WEIGHT: 269.8 LBS

## 2025-06-27 DIAGNOSIS — B35.3 TINEA PEDIS OF LEFT FOOT: Primary | ICD-10-CM

## 2025-06-27 DIAGNOSIS — B35.1 NAIL FUNGUS: ICD-10-CM

## 2025-06-27 DIAGNOSIS — I82.432 ACUTE DEEP VEIN THROMBOSIS (DVT) OF POPLITEAL VEIN OF LEFT LOWER EXTREMITY (HCC): ICD-10-CM

## 2025-06-27 PROCEDURE — 99214 OFFICE O/P EST MOD 30 MIN: CPT

## 2025-06-27 RX ORDER — PRENATAL VIT 91/IRON/FOLIC/DHA 28-975-200
COMBINATION PACKAGE (EA) ORAL 2 TIMES DAILY
Qty: 15 G | Refills: 1 | Status: SHIPPED | OUTPATIENT
Start: 2025-06-27

## 2025-06-27 NOTE — PROGRESS NOTES
Name: Ej Clay      : 1950      MRN: [id number]  Encounter Provider: Ricky Cisneros MD  Encounter Date: 2025   Encounter department: [facility] [address]  :  Assessment & Plan  Acute deep vein thrombosis (DVT) of popliteal vein of left lower extremity (HCC)  Had extensive conversation reiterating recommendations from both vascular surgery and hematology.  Will continue plan for 6 months of anticoagulation with Eliquis.  Advised to let us know immediately and go to the emergency room for any worsening symptoms in the lower left leg, abnormal bleeding or bruising, or symptoms of PE such as acute shortness of breath or elevated heart rate.  Reiterated specialist recommendations that no further vascular studies or hematology workup are necessary at this time, but to let us know if there are any changes in his symptoms.  Advised that a hematology workup could potentially be beneficial if done after he has completed anticoagulation.  Assured him that he is up-to-date on his recommended routine cancer screenings, but it is reasonable to follow-up with gastroenterology in consideration of a colonoscopy for colon cancer screening which is more sensitive than his recent Cologuard.  Very slight increase in both swelling and erythema in lower left leg relative to the right, normal pedal pulses and cap refill bilaterally, negative Homans' sign.  Assured patient that he is being appropriately managed       Tinea pedis of left foot  Dry scaly skin noted on sole of left foot consistent with tinea pedis  Orders:    terbinafine (LamISIL) 1 % cream; Apply topically 2 (two) times a day    Nail fungus  Oral antifungal declined, will try topical antifungal  Orders:    Efinaconazole 10 % SOLN; Apply 1 Application topically daily in the early morning Apply to affected toenail(s) once daily for 48 weeks; ensure complete coverage of the toenail, the toenail folds, toenail bed, surrounding skin, and  the undersurface of the toenail plate           History of Present Illness   HPI  Patient presents for DVT follow-up    Review of Systems   Respiratory:  Negative for shortness of breath.    Cardiovascular:  Positive for leg swelling (slight swelling in lower left leg).   Musculoskeletal:  Negative for arthralgias, gait problem and joint swelling.   Skin:  Positive for color change (slight redness in lower left leg).   Hematological:  Does not bruise/bleed easily.       Objective   /80   Pulse 78   Ht 6' (1.829 m)   Wt 122 kg (269 lb 12.8 oz)   SpO2 97%   BMI 36.59 kg/m²      Physical Exam  Constitutional:       General: He is not in acute distress.     Appearance: Normal appearance.     Musculoskeletal:      Comments: Minimal increase in swelling and erythema in lower left leg relative to right.  No pitting edema.  Bilateral pedal pulses and cap refill intact.  Negative Homans' sign on left leg.  Some dry scaly skin noted around sole of left foot concerning for athlete's foot.  Some yellowing fungus noted on toenails     Neurological:      Mental Status: He is alert and oriented to person, place, and time.     Psychiatric:         Mood and Affect: Mood normal.         Behavior: Behavior normal.

## 2025-07-01 ENCOUNTER — TELEPHONE (OUTPATIENT)
Age: 75
End: 2025-07-01

## 2025-07-01 DIAGNOSIS — B35.3 TINEA PEDIS OF LEFT FOOT: Primary | ICD-10-CM

## 2025-07-01 DIAGNOSIS — I82.432 ACUTE DEEP VEIN THROMBOSIS (DVT) OF POPLITEAL VEIN OF LEFT LOWER EXTREMITY (HCC): ICD-10-CM

## 2025-07-01 RX ORDER — CLOTRIMAZOLE 1 %
CREAM (GRAM) TOPICAL 2 TIMES DAILY PRN
Qty: 12 G | Refills: 2 | Status: SHIPPED | OUTPATIENT
Start: 2025-07-01

## 2025-07-01 NOTE — TELEPHONE ENCOUNTER
Patient called he said the Lamisil 1% cream is not covered by insurance and he was not able to get it at the pharmacy.  He got an OTC cream instead but wanted to see if a different cream could be prescribed to see if it will be covered.      Also, he asked if a physical therapy referral can be ordered for him for his left leg DVT.  He said he went to [facility] for physical therapy.    Thank you

## 2025-07-02 ENCOUNTER — TELEPHONE (OUTPATIENT)
Age: 75
End: 2025-07-02

## 2025-07-02 NOTE — TELEPHONE ENCOUNTER
Pt is requesting an alternate order for the Lotrimin as it is too expensive. Pt was informed that there are cheaper alteratives.    [facility]/pharmacy #[id number] - [place] - [address] [phone]

## 2025-07-06 ENCOUNTER — OFFICE VISIT (OUTPATIENT)
Dept: URGENT CARE | Facility: CLINIC | Age: 75
End: 2025-07-06
Payer: MEDICARE

## 2025-07-06 VITALS
TEMPERATURE: 98.6 F | WEIGHT: 269 LBS | BODY MASS INDEX: 36.44 KG/M2 | DIASTOLIC BLOOD PRESSURE: 96 MMHG | RESPIRATION RATE: 18 BRPM | HEIGHT: 72 IN | OXYGEN SATURATION: 96 % | SYSTOLIC BLOOD PRESSURE: 142 MMHG | HEART RATE: 79 BPM

## 2025-07-06 DIAGNOSIS — Z71.1 PHYSICALLY WELL BUT WORRIED: Primary | ICD-10-CM

## 2025-07-06 PROCEDURE — 99213 OFFICE O/P EST LOW 20 MIN: CPT

## 2025-07-06 PROCEDURE — G0463 HOSPITAL OUTPT CLINIC VISIT: HCPCS

## 2025-07-06 NOTE — PROGRESS NOTES
[facility]        NAME: Ej Clay is a 75 y.o. male  : 1950    MRN: [id number]  DATE: 2025  TIME: 3:46 PM    Assessment and Plan   Physically well but worried [Z71.1]  1. Physically well but worried              Patient Instructions       Follow up with PCP in 3-5 days.  Proceed to  ER if symptoms worsen.    If tests have been performed at [facility], our office will contact you with results if changes need to be made to the care plan discussed with you at the visit.  You can review your full results on [facility] MyChart.    Chief Complaint     Chief Complaint   Patient presents with    Fever     Pt reports fever last night of 100.8 F. States 99.8 F this morning and afternoon. C/o chronic issues of vomiting, diarrhea, fatigue, and brain fog with onset in February. Dx dental abscess in March treated with Amoxicillin. States similar symptoms since with concerns of dental issue.          History of Present Illness       76 y/o M presents for   Fevers - 100.8 last night. This AM 99.8. Concerned for possible dental infection as he has had one in the past that was asymptotic.   Would like temperature rechecked due to concerns of infection      Fever  Pertinent negatives include no chest pain, chills, congestion, coughing, fatigue, fever or sore throat.       Review of Systems   Review of Systems   Constitutional:  Negative for chills, fatigue and fever.   HENT:  Negative for congestion, ear pain, postnasal drip, rhinorrhea and sore throat.    Respiratory:  Negative for cough and shortness of breath.    Cardiovascular:  Negative for chest pain and palpitations.         Current Medications     Current Medications[1]    Current Allergies     Allergies as of 2025    (No Known Allergies)            The following portions of the patient's history were reviewed and updated as appropriate: allergies, current medications, past family history, past medical history, past social history, past surgical  history and problem list.     Past Medical History[2]    Past Surgical History[3]    Family History[4]      Medications have been verified.        Objective   /96 (BP Location: Right arm, Patient Position: Sitting, Cuff Size: Standard)   Pulse 79   Temp 98.6 °F (37 °C) (Tympanic)   Resp 18   Ht 6' (1.829 m)   Wt 122 kg (269 lb)   SpO2 96%   BMI 36.48 kg/m²   No LMP for male patient.       Physical Exam     Physical Exam  Vitals and nursing note reviewed.   Constitutional:       General: He is not in acute distress.     Appearance: He is not toxic-appearing.   HENT:      Head: Normocephalic and atraumatic.      Right Ear: Tympanic membrane, ear canal and external ear normal.      Left Ear: Tympanic membrane, ear canal and external ear normal.      Nose: Nose normal.      Mouth/Throat:      Mouth: Mucous membranes are moist.      Pharynx: No oropharyngeal exudate or posterior oropharyngeal erythema.     Eyes:      Conjunctiva/sclera: Conjunctivae normal.       Cardiovascular:      Rate and Rhythm: Normal rate and regular rhythm.   Pulmonary:      Effort: Pulmonary effort is normal.      Breath sounds: Normal breath sounds.   Lymphadenopathy:      Cervical: No cervical adenopathy.     Neurological:      Mental Status: He is alert.      Coordination: Coordination normal.      Gait: Gait normal.     Psychiatric:         Mood and Affect: Mood normal.         Behavior: Behavior normal.      Comments: anxious                        [1]   Current Outpatient Medications:     apixaban (ELIQUIS) 5 mg, Take 2 tablets (10 mg total) by mouth 2 (two) times a day for 7 days, THEN 1 tablet (5 mg total) 2 (two) times a day., Disp: 194 tablet, Rfl: 0    Cholecalciferol (VITAMIN D3) 1,000 units tablet, Take 1 tablet (1,000 Units total) by mouth daily, Disp: 90 tablet, Rfl: 1    clotrimazole (LOTRIMIN) 1 % cream, Apply topically 2 (two) times a day as needed (dry scaly fungal rash on fett), Disp: 12 g, Rfl: 2     Efinaconazole 10 % SOLN, Apply 1 Application topically daily in the early morning Apply to affected toenail(s) once daily for 48 weeks; ensure complete coverage of the toenail, the toenail folds, toenail bed, surrounding skin, and the undersurface of the toenail plate, Disp: 8 mL, Rfl: 1    terbinafine (LamISIL) 1 % cream, Apply topically 2 (two) times a day, Disp: 15 g, Rfl: 1  [2]   Past Medical History:  Diagnosis Date    Back pain    [3]   Past Surgical History:  Procedure Laterality Date    COLONOSCOPY     [4]   Family History  Problem Relation Name Age of Onset    Ovarian cancer Mother      Thyroid disease Father      Hypertension Father      Heart disease Father      Diabetes Brother      Heart disease Brother      Skin cancer Maternal Grandfather      Cancer Family      Colon cancer Neg Hx      Colon polyps Neg Hx

## 2025-07-07 ENCOUNTER — TELEPHONE (OUTPATIENT)
Age: 75
End: 2025-07-07

## 2025-07-08 ENCOUNTER — APPOINTMENT (OUTPATIENT)
Dept: LAB | Facility: HOSPITAL | Age: 75
End: 2025-07-08
Payer: COMMERCIAL

## 2025-07-08 ENCOUNTER — OFFICE VISIT (OUTPATIENT)
Dept: FAMILY MEDICINE CLINIC | Facility: HOSPITAL | Age: 75
End: 2025-07-08
Payer: MEDICARE

## 2025-07-08 VITALS
HEIGHT: 72 IN | OXYGEN SATURATION: 95 % | TEMPERATURE: 100.9 F | BODY MASS INDEX: 36.14 KG/M2 | DIASTOLIC BLOOD PRESSURE: 80 MMHG | SYSTOLIC BLOOD PRESSURE: 124 MMHG | WEIGHT: 266.8 LBS | HEART RATE: 93 BPM

## 2025-07-08 DIAGNOSIS — R50.9 FEVER, UNSPECIFIED FEVER CAUSE: Primary | ICD-10-CM

## 2025-07-08 DIAGNOSIS — R50.9 FEVER, UNSPECIFIED FEVER CAUSE: ICD-10-CM

## 2025-07-08 DIAGNOSIS — Z01.84 IMMUNITY STATUS TESTING: ICD-10-CM

## 2025-07-08 DIAGNOSIS — I82.402 ACUTE DEEP VEIN THROMBOSIS (DVT) OF LEFT LOWER EXTREMITY, UNSPECIFIED VEIN (HCC): ICD-10-CM

## 2025-07-08 DIAGNOSIS — G47.00 INSOMNIA, UNSPECIFIED TYPE: ICD-10-CM

## 2025-07-08 LAB
ALBUMIN SERPL BCG-MCNC: 3.7 G/DL (ref 3.5–5)
ALP SERPL-CCNC: 52 U/L (ref 34–104)
ALT SERPL W P-5'-P-CCNC: 11 U/L (ref 7–52)
ANION GAP SERPL CALCULATED.3IONS-SCNC: 7 MMOL/L (ref 4–13)
AST SERPL W P-5'-P-CCNC: 17 U/L (ref 13–39)
BASOPHILS # BLD AUTO: 0.03 THOUSANDS/ÂΜL (ref 0–0.1)
BASOPHILS NFR BLD AUTO: 1 % (ref 0–1)
BILIRUB SERPL-MCNC: 0.53 MG/DL (ref 0.2–1)
BUN SERPL-MCNC: 22 MG/DL (ref 5–25)
CALCIUM SERPL-MCNC: 9 MG/DL (ref 8.4–10.2)
CHLORIDE SERPL-SCNC: 99 MMOL/L (ref 96–108)
CO2 SERPL-SCNC: 27 MMOL/L (ref 21–32)
CREAT SERPL-MCNC: 0.94 MG/DL (ref 0.6–1.3)
EOSINOPHIL # BLD AUTO: 0.05 THOUSAND/ÂΜL (ref 0–0.61)
EOSINOPHIL NFR BLD AUTO: 1 % (ref 0–6)
ERYTHROCYTE [DISTWIDTH] IN BLOOD BY AUTOMATED COUNT: 12.2 % (ref 11.6–15.1)
GFR SERPL CREATININE-BSD FRML MDRD: 78 ML/MIN/1.73SQ M
GLUCOSE SERPL-MCNC: 96 MG/DL (ref 65–140)
HCT VFR BLD AUTO: 47.4 % (ref 36.5–49.3)
HGB BLD-MCNC: 15.7 G/DL (ref 12–17)
IMM GRANULOCYTES # BLD AUTO: 0.02 THOUSAND/UL (ref 0–0.2)
IMM GRANULOCYTES NFR BLD AUTO: 0 % (ref 0–2)
LYMPHOCYTES # BLD AUTO: 0.81 THOUSANDS/ÂΜL (ref 0.6–4.47)
LYMPHOCYTES NFR BLD AUTO: 18 % (ref 14–44)
MCH RBC QN AUTO: 30.3 PG (ref 26.8–34.3)
MCHC RBC AUTO-ENTMCNC: 33.1 G/DL (ref 31.4–37.4)
MCV RBC AUTO: 91 FL (ref 82–98)
MONOCYTES # BLD AUTO: 0.61 THOUSAND/ÂΜL (ref 0.17–1.22)
MONOCYTES NFR BLD AUTO: 13 % (ref 4–12)
NEUTROPHILS # BLD AUTO: 3.06 THOUSANDS/ÂΜL (ref 1.85–7.62)
NEUTS SEG NFR BLD AUTO: 67 % (ref 43–75)
NRBC BLD AUTO-RTO: 0 /100 WBCS
PLATELET # BLD AUTO: 166 THOUSANDS/UL (ref 149–390)
PMV BLD AUTO: 9.9 FL (ref 8.9–12.7)
POTASSIUM SERPL-SCNC: 4.4 MMOL/L (ref 3.5–5.3)
PROT SERPL-MCNC: 7.2 G/DL (ref 6.4–8.4)
RBC # BLD AUTO: 5.19 MILLION/UL (ref 3.88–5.62)
SARS-COV-2 AG UPPER RESP QL IA: NEGATIVE
SODIUM SERPL-SCNC: 133 MMOL/L (ref 135–147)
VALID CONTROL: NORMAL
WBC # BLD AUTO: 4.58 THOUSAND/UL (ref 4.31–10.16)

## 2025-07-08 PROCEDURE — 86765 RUBEOLA ANTIBODY: CPT

## 2025-07-08 PROCEDURE — G2211 COMPLEX E/M VISIT ADD ON: HCPCS | Performed by: NURSE PRACTITIONER

## 2025-07-08 PROCEDURE — 36415 COLL VENOUS BLD VENIPUNCTURE: CPT

## 2025-07-08 PROCEDURE — 87811 SARS-COV-2 COVID19 W/OPTIC: CPT | Performed by: NURSE PRACTITIONER

## 2025-07-08 PROCEDURE — 86618 LYME DISEASE ANTIBODY: CPT

## 2025-07-08 PROCEDURE — 85025 COMPLETE CBC W/AUTO DIFF WBC: CPT

## 2025-07-08 PROCEDURE — 99214 OFFICE O/P EST MOD 30 MIN: CPT | Performed by: NURSE PRACTITIONER

## 2025-07-08 PROCEDURE — 80053 COMPREHEN METABOLIC PANEL: CPT

## 2025-07-08 NOTE — PROGRESS NOTES
"Name: Ej Clay      : 1950      MRN: [id number]  Encounter Provider: KYE Canas  Encounter Date: 2025   Encounter department: [facility] [address]   :  Assessment & Plan  Fever, unspecified fever cause  In office rapid covid test is negative   Recommend further FUO work up with labs as ordered - will determine further plan pending results  Continue supportive measures w/adequate rest & hydration with tylenol prn    Orders:    POCT Rapid Covid Ag    CBC and differential; Future    Comprehensive metabolic panel; Future    Lyme Total AB W Reflex to IGM/IGG; Future    Urine culture; Future    Acute deep vein thrombosis (DVT) of left lower extremity, unspecified vein (HCC)  Advise he continue eliquis as rx'd by PCP       Insomnia, unspecified type  Advise it is OK to take OTC diphenhydramine sleep aide prn w/o concern for interaction w/eliquis               History of Present Illness       States he has been dizzy, lightheaded, and run down for a few weeks probably. He took his temperature 3 days ago and it was 100.9. He went to the  the next day and had temp of 98.6 so was told his thermometer is bad.   Has been out in the heat a lot. He has a rash on the back of his neck - he believes for about 2 months. States he cannot see this well. States it feels \"prickly\" at times.   He was diagnosed with DVT 8 weeks ago.         Review of Systems   Constitutional:  Positive for chills (once in awhile), fatigue and fever.   HENT:  Positive for dental problem (dental abscess/fistula in April, treated with amoxicillin and felt better overall). Negative for congestion, rhinorrhea, sneezing and sore throat.    Respiratory: Negative.     Cardiovascular:  Positive for leg swelling (left leg improving).   Genitourinary:  Positive for frequency (he attributes to blood thinner).   Skin:  Positive for rash.   Neurological:  Positive for dizziness and light-headedness. "   Psychiatric/Behavioral:  Positive for sleep disturbance (questions if he can take OTC sleep med while being on eliquis).          Objective   /80   Pulse 93   Temp (!) 100.9 °F (38.3 °C)   Ht 6' (1.829 m)   Wt 121 kg (266 lb 12.8 oz)   SpO2 95%   BMI 36.18 kg/m²          Physical Exam  Vitals reviewed.   Constitutional:       General: He is not in acute distress.     Appearance: Normal appearance.   HENT:      Head: Normocephalic.      Nose: Nose normal.      Mouth/Throat:      Mouth: Mucous membranes are moist.      Pharynx: Oropharynx is clear. No oropharyngeal exudate or posterior oropharyngeal erythema.     Eyes:      General: No scleral icterus.        Right eye: No discharge.         Left eye: No discharge.      Conjunctiva/sclera: Conjunctivae normal.       Cardiovascular:      Rate and Rhythm: Normal rate and regular rhythm.   Pulmonary:      Effort: Pulmonary effort is normal. No respiratory distress.      Breath sounds: Normal breath sounds.      Comments: No cough    Musculoskeletal:      Cervical back: Normal range of motion.   Lymphadenopathy:      Cervical: No cervical adenopathy.     Skin:     General: Skin is warm and dry.      Findings: Rash present.          Neurological:      General: No focal deficit present.      Mental Status: He is alert and oriented to person, place, and time.     Psychiatric:         Mood and Affect: Mood normal.         Behavior: Behavior normal.         Administrative Statements   I have spent a total time of 20 minutes in caring for this patient on the day of the visit/encounter including Diagnostic results, Instructions for management, Patient and family education, Impressions, Counseling / Coordination of care, Documenting in the medical record, Reviewing/placing orders in the medical record (including tests, medications, and/or procedures), and Obtaining or reviewing history

## 2025-07-09 ENCOUNTER — TELEPHONE (OUTPATIENT)
Age: 75
End: 2025-07-09

## 2025-07-09 ENCOUNTER — LAB (OUTPATIENT)
Dept: LAB | Facility: HOSPITAL | Age: 75
End: 2025-07-09
Payer: COMMERCIAL

## 2025-07-09 ENCOUNTER — RESULTS FOLLOW-UP (OUTPATIENT)
Dept: FAMILY MEDICINE CLINIC | Facility: HOSPITAL | Age: 75
End: 2025-07-09

## 2025-07-09 LAB — B BURGDOR IGG+IGM SER QL IA: NEGATIVE

## 2025-07-09 PROCEDURE — 87086 URINE CULTURE/COLONY COUNT: CPT

## 2025-07-09 NOTE — TELEPHONE ENCOUNTER
Pt called to see if results from Urine Culture are back yet. Please call pt when they are in as he is still having a temperature, dizzy and lightheaded.

## 2025-07-09 NOTE — TELEPHONE ENCOUNTER
Urine for culture was provided today and will take a couple days to result. Advise adequate hydration and rest with OTC meds as needed in the meantime. Also please be sure pt is aware his lyme titer is negative.

## 2025-07-10 LAB — BACTERIA UR CULT: NORMAL

## 2025-07-10 NOTE — TELEPHONE ENCOUNTER
Tylenol is best. He should not take aspirin and should use NSAIDs sparingly (advil, aleve, ibuprofen)

## 2025-07-10 NOTE — TELEPHONE ENCOUNTER
Patient called back and I read him Joe's message about his culture being negative but he said that he think there may still be an infection, he believes that results are inconclusive. I did offer to have Joe speak to him when she is available but he opted to schedule a follow up with Dr. March.    He did report that he doesn't have pain while urinating, but has pain in his groin when moving. He also said he had 2 small blood clots come out in his urine. His fever is normal now, was between 99 and 101.5 for about 5 days. He is schedule with Dr March next Tuesday.    Thanks

## 2025-07-12 LAB
MEV IGG SER IA-ACNC: >300 AU/ML
MEV IGM TITR SER IF: ABNORMAL TITER

## 2025-07-14 ENCOUNTER — TELEPHONE (OUTPATIENT)
Age: 75
End: 2025-07-14

## 2025-07-14 NOTE — TELEPHONE ENCOUNTER
Patients [facility] will be contacting the office to discuss co-managing the PT orders and possible peer to peer consult as they have some additional questions they would like to discuss with the ordering PT provider. Thank you

## 2025-07-31 ENCOUNTER — APPOINTMENT (OUTPATIENT)
Dept: LAB | Facility: HOSPITAL | Age: 75
End: 2025-07-31
Payer: MEDICARE

## 2025-07-31 DIAGNOSIS — I82.412 ACUTE DEEP VEIN THROMBOSIS OF LEFT FEMORAL VEIN (HCC): ICD-10-CM

## 2025-07-31 PROCEDURE — 86147 CARDIOLIPIN ANTIBODY EA IG: CPT

## 2025-07-31 PROCEDURE — 36415 COLL VENOUS BLD VENIPUNCTURE: CPT

## 2025-07-31 PROCEDURE — 86146 BETA-2 GLYCOPROTEIN ANTIBODY: CPT

## 2025-08-02 LAB
B2 GLYCOPROT1 IGA SERPL IA-ACNC: 1.5
B2 GLYCOPROT1 IGG SERPL IA-ACNC: 1
B2 GLYCOPROT1 IGM SERPL IA-ACNC: <2.4
CARDIOLIPIN IGA SER IA-ACNC: 3.3
CARDIOLIPIN IGG SER IA-ACNC: 2.8
CARDIOLIPIN IGM SER IA-ACNC: 2.1

## 2025-08-06 ENCOUNTER — APPOINTMENT (OUTPATIENT)
Dept: LAB | Facility: HOSPITAL | Age: 75
End: 2025-08-06
Payer: COMMERCIAL

## 2025-08-09 ENCOUNTER — NURSE TRIAGE (OUTPATIENT)
Dept: OTHER | Facility: OTHER | Age: 75
End: 2025-08-09

## 2025-08-09 DIAGNOSIS — I82.432 ACUTE DEEP VEIN THROMBOSIS (DVT) OF POPLITEAL VEIN OF LEFT LOWER EXTREMITY (HCC): ICD-10-CM

## 2025-08-11 ENCOUNTER — TELEPHONE (OUTPATIENT)
Age: 75
End: 2025-08-11

## 2025-08-11 ENCOUNTER — NURSE TRIAGE (OUTPATIENT)
Age: 75
End: 2025-08-11

## 2025-08-15 ENCOUNTER — TELEPHONE (OUTPATIENT)
Age: 75
End: 2025-08-15

## 2025-08-20 ENCOUNTER — TELEMEDICINE (OUTPATIENT)
Dept: FAMILY MEDICINE CLINIC | Facility: HOSPITAL | Age: 75
End: 2025-08-20
Payer: COMMERCIAL

## 2025-08-20 VITALS — HEIGHT: 72 IN | BODY MASS INDEX: 36.03 KG/M2 | WEIGHT: 266 LBS

## 2025-08-20 DIAGNOSIS — I82.432 ACUTE DEEP VEIN THROMBOSIS (DVT) OF POPLITEAL VEIN OF LEFT LOWER EXTREMITY (HCC): ICD-10-CM

## 2025-08-20 DIAGNOSIS — B35.3 TINEA PEDIS OF LEFT FOOT: Primary | ICD-10-CM

## 2025-08-20 DIAGNOSIS — B35.1 ONYCHOMYCOSIS: ICD-10-CM

## 2025-08-20 PROCEDURE — 99213 OFFICE O/P EST LOW 20 MIN: CPT

## 2025-08-20 PROCEDURE — G2211 COMPLEX E/M VISIT ADD ON: HCPCS
